# Patient Record
Sex: FEMALE | Race: WHITE | NOT HISPANIC OR LATINO
[De-identification: names, ages, dates, MRNs, and addresses within clinical notes are randomized per-mention and may not be internally consistent; named-entity substitution may affect disease eponyms.]

---

## 2018-09-27 PROBLEM — Z00.00 ENCOUNTER FOR PREVENTIVE HEALTH EXAMINATION: Status: ACTIVE | Noted: 2018-09-27

## 2018-10-15 ENCOUNTER — APPOINTMENT (OUTPATIENT)
Dept: HEART AND VASCULAR | Facility: CLINIC | Age: 68
End: 2018-10-15
Payer: MEDICARE

## 2018-10-15 VITALS
DIASTOLIC BLOOD PRESSURE: 70 MMHG | HEART RATE: 70 BPM | SYSTOLIC BLOOD PRESSURE: 121 MMHG | WEIGHT: 97 LBS | RESPIRATION RATE: 14 BRPM | TEMPERATURE: 97.7 F | OXYGEN SATURATION: 96 % | HEIGHT: 60 IN | BODY MASS INDEX: 19.04 KG/M2

## 2018-10-15 DIAGNOSIS — Z87.891 PERSONAL HISTORY OF NICOTINE DEPENDENCE: ICD-10-CM

## 2018-10-15 DIAGNOSIS — Z82.3 FAMILY HISTORY OF STROKE: ICD-10-CM

## 2018-10-15 PROCEDURE — 93880 EXTRACRANIAL BILAT STUDY: CPT

## 2018-10-15 PROCEDURE — 93306 TTE W/DOPPLER COMPLETE: CPT

## 2018-10-15 PROCEDURE — 99204 OFFICE O/P NEW MOD 45 MIN: CPT

## 2018-10-30 ENCOUNTER — APPOINTMENT (OUTPATIENT)
Dept: HEART AND VASCULAR | Facility: CLINIC | Age: 68
End: 2018-10-30
Payer: MEDICARE

## 2018-10-30 DIAGNOSIS — R94.31 ABNORMAL ELECTROCARDIOGRAM [ECG] [EKG]: ICD-10-CM

## 2018-10-30 DIAGNOSIS — R94.39 ABNORMAL RESULT OF OTHER CARDIOVASCULAR FUNCTION STUDY: ICD-10-CM

## 2018-10-30 DIAGNOSIS — I65.23 OCCLUSION AND STENOSIS OF BILATERAL CAROTID ARTERIES: ICD-10-CM

## 2018-10-30 PROCEDURE — A9500: CPT

## 2018-10-30 PROCEDURE — 78452 HT MUSCLE IMAGE SPECT MULT: CPT

## 2018-10-30 PROCEDURE — 93015 CV STRESS TEST SUPVJ I&R: CPT

## 2018-10-30 PROCEDURE — 99214 OFFICE O/P EST MOD 30 MIN: CPT | Mod: 25

## 2018-10-31 PROBLEM — R94.39 ABNORMAL STRESS ECG: Status: ACTIVE | Noted: 2018-10-31

## 2022-12-09 ENCOUNTER — APPOINTMENT (OUTPATIENT)
Dept: PULMONOLOGY | Facility: CLINIC | Age: 72
End: 2022-12-09

## 2022-12-09 VITALS
BODY MASS INDEX: 18.06 KG/M2 | HEIGHT: 60 IN | HEART RATE: 77 BPM | SYSTOLIC BLOOD PRESSURE: 120 MMHG | DIASTOLIC BLOOD PRESSURE: 74 MMHG | OXYGEN SATURATION: 96 % | WEIGHT: 92 LBS

## 2022-12-09 DIAGNOSIS — K58.9 IRRITABLE BOWEL SYNDROME W/OUT DIARRHEA: ICD-10-CM

## 2022-12-09 PROCEDURE — 99204 OFFICE O/P NEW MOD 45 MIN: CPT

## 2022-12-09 RX ORDER — TRIAMCINOLONE ACETONIDE 1 MG/G
0.1 CREAM TOPICAL
Qty: 30 | Refills: 0 | Status: ACTIVE | COMMUNITY
Start: 2022-11-29

## 2022-12-09 RX ORDER — TERIPARATIDE 250 UG/ML
600 INJECTION, SOLUTION SUBCUTANEOUS
Qty: 2 | Refills: 0 | Status: ACTIVE | COMMUNITY
Start: 2022-11-21

## 2022-12-09 RX ORDER — HYDROCORTISONE 25 MG/G
2.5 CREAM TOPICAL
Qty: 20 | Refills: 0 | Status: ACTIVE | COMMUNITY
Start: 2022-11-29

## 2022-12-09 RX ORDER — ELUXADOLINE 75 MG/1
75 TABLET, FILM COATED ORAL
Qty: 60 | Refills: 0 | Status: DISCONTINUED | COMMUNITY
Start: 2022-10-11 | End: 2022-12-09

## 2022-12-09 RX ORDER — NYSTATIN 100000 U/G
100000 OINTMENT TOPICAL
Qty: 30 | Refills: 0 | Status: ACTIVE | COMMUNITY
Start: 2022-11-29

## 2022-12-09 RX ORDER — ESCITALOPRAM OXALATE 10 MG/1
10 TABLET ORAL
Qty: 30 | Refills: 0 | Status: ACTIVE | COMMUNITY
Start: 2022-09-30

## 2022-12-09 RX ORDER — RIFAXIMIN 550 MG/1
550 TABLET ORAL
Qty: 42 | Refills: 0 | Status: DISCONTINUED | COMMUNITY
Start: 2022-08-17 | End: 2022-12-09

## 2022-12-09 RX ORDER — DIPHENOXYLATE HYDROCHLORIDE AND ATROPINE SULFATE 2.5; .025 MG/1; MG/1
2.5-0.025 TABLET ORAL
Qty: 120 | Refills: 0 | Status: ACTIVE | COMMUNITY
Start: 2022-11-18

## 2022-12-09 NOTE — HISTORY OF PRESENT ILLNESS
[TextBox_4] : 2022: Asked to evaluate patient by Dr Lincoln for lung nodules. Smoked about 2 ppd till 6-7 years ago. Has been told she has emphysema. Some dyspnea but not limited in her day to day activities; no history of respiratory exacerbations. No cough or sputum, no hemoptysis, no weight loss. No personal history of cancer. Her brother  in his 80s of lung cancer. 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 He  of Covid in 2020 along with her brother-in-law and sister-in-law. She was never symptomatic but had antibodies subsequently. She is fully vaccinated. [ESS] : 3

## 2022-12-09 NOTE — CONSULT LETTER
[Dear  ___] : Dear  [unfilled], [Courtesy Letter:] : I had the pleasure of seeing your patient, [unfilled], in my office today. [Please see my note below.] : Please see my note below. [Consult Closing:] : Thank you very much for allowing me to participate in the care of this patient.  If you have any questions, please do not hesitate to contact me. [Sincerely,] : Sincerely, [FreeTextEntry2] : Franklyn Lincoln MD\par [address], [address]\[place] [address]\par  [FreeTextEntry3] : Samia Zapien MD, FCCP\par

## 2022-12-09 NOTE — ASSESSMENT
[FreeTextEntry1] : Data reviewed:\par \par We reviewed her CTs together at [facility] from 2018, 2019, 2020, 2021 and 2022. These show severe emphysema with a dominant 5mm nodular opacity at the L apex which has not appreciably changed since 2018. There is some apical scarring.\par \par Impression:\par 5mm nodule\par Severe emphysema, asymptomatic\par \par Plan:\par Continue with annual LDCT.\par This particular nodule can be presumed benign given the long term stability.

## 2022-12-09 NOTE — PHYSICAL EXAM
[No Acute Distress] : no acute distress [Normal Rate/Rhythm] : normal rate/rhythm [Normal S1, S2] : normal s1, s2 [No Murmurs] : no murmurs [No Clubbing] : no clubbing [No Edema] : no edema

## 2023-01-06 ENCOUNTER — APPOINTMENT (OUTPATIENT)
Dept: PULMONOLOGY | Facility: CLINIC | Age: 73
End: 2023-01-06

## 2023-12-04 ENCOUNTER — INPATIENT (INPATIENT)
Facility: HOSPITAL | Age: 73
LOS: 5 days | Discharge: HOME HEALTH | DRG: 871 | End: 2023-12-10
Attending: INTERNAL MEDICINE | Admitting: INTERNAL MEDICINE
Payer: MEDICARE

## 2023-12-04 VITALS
DIASTOLIC BLOOD PRESSURE: 69 MMHG | HEIGHT: 60 IN | WEIGHT: 91.93 LBS | HEART RATE: 95 BPM | SYSTOLIC BLOOD PRESSURE: 100 MMHG | TEMPERATURE: 97 F | RESPIRATION RATE: 20 BRPM | OXYGEN SATURATION: 93 %

## 2023-12-04 DIAGNOSIS — J96.01 ACUTE RESPIRATORY FAILURE WITH HYPOXIA: ICD-10-CM

## 2023-12-04 DIAGNOSIS — Z29.9 ENCOUNTER FOR PROPHYLACTIC MEASURES, UNSPECIFIED: ICD-10-CM

## 2023-12-04 DIAGNOSIS — E78.5 HYPERLIPIDEMIA, UNSPECIFIED: ICD-10-CM

## 2023-12-04 DIAGNOSIS — J44.1 CHRONIC OBSTRUCTIVE PULMONARY DISEASE WITH (ACUTE) EXACERBATION: ICD-10-CM

## 2023-12-04 DIAGNOSIS — D49.0 NEOPLASM OF UNSPECIFIED BEHAVIOR OF DIGESTIVE SYSTEM: Chronic | ICD-10-CM

## 2023-12-04 DIAGNOSIS — B33.8 OTHER SPECIFIED VIRAL DISEASES: ICD-10-CM

## 2023-12-04 DIAGNOSIS — R05.9 COUGH, UNSPECIFIED: ICD-10-CM

## 2023-12-04 LAB
ALBUMIN SERPL ELPH-MCNC: 3.5 G/DL — SIGNIFICANT CHANGE UP (ref 3.3–5)
ALBUMIN SERPL ELPH-MCNC: 3.5 G/DL — SIGNIFICANT CHANGE UP (ref 3.3–5)
ALP SERPL-CCNC: 86 U/L — SIGNIFICANT CHANGE UP (ref 40–120)
ALP SERPL-CCNC: 86 U/L — SIGNIFICANT CHANGE UP (ref 40–120)
ALT FLD-CCNC: 25 U/L — SIGNIFICANT CHANGE UP (ref 10–45)
ALT FLD-CCNC: 25 U/L — SIGNIFICANT CHANGE UP (ref 10–45)
ANION GAP SERPL CALC-SCNC: 13 MMOL/L — SIGNIFICANT CHANGE UP (ref 5–17)
ANION GAP SERPL CALC-SCNC: 13 MMOL/L — SIGNIFICANT CHANGE UP (ref 5–17)
APPEARANCE UR: CLEAR — SIGNIFICANT CHANGE UP
APPEARANCE UR: CLEAR — SIGNIFICANT CHANGE UP
APTT BLD: 34.2 SEC — SIGNIFICANT CHANGE UP (ref 24.5–35.6)
APTT BLD: 34.2 SEC — SIGNIFICANT CHANGE UP (ref 24.5–35.6)
AST SERPL-CCNC: 44 U/L — HIGH (ref 10–40)
AST SERPL-CCNC: 44 U/L — HIGH (ref 10–40)
BACTERIA # UR AUTO: NEGATIVE /HPF — SIGNIFICANT CHANGE UP
BACTERIA # UR AUTO: NEGATIVE /HPF — SIGNIFICANT CHANGE UP
BASOPHILS # BLD AUTO: 0 K/UL — SIGNIFICANT CHANGE UP (ref 0–0.2)
BASOPHILS # BLD AUTO: 0 K/UL — SIGNIFICANT CHANGE UP (ref 0–0.2)
BASOPHILS NFR BLD AUTO: 0 % — SIGNIFICANT CHANGE UP (ref 0–2)
BASOPHILS NFR BLD AUTO: 0 % — SIGNIFICANT CHANGE UP (ref 0–2)
BILIRUB SERPL-MCNC: 0.4 MG/DL — SIGNIFICANT CHANGE UP (ref 0.2–1.2)
BILIRUB SERPL-MCNC: 0.4 MG/DL — SIGNIFICANT CHANGE UP (ref 0.2–1.2)
BILIRUB UR-MCNC: NEGATIVE — SIGNIFICANT CHANGE UP
BILIRUB UR-MCNC: NEGATIVE — SIGNIFICANT CHANGE UP
BUN SERPL-MCNC: 14 MG/DL — SIGNIFICANT CHANGE UP (ref 7–23)
BUN SERPL-MCNC: 14 MG/DL — SIGNIFICANT CHANGE UP (ref 7–23)
BURR CELLS BLD QL SMEAR: PRESENT — SIGNIFICANT CHANGE UP
BURR CELLS BLD QL SMEAR: PRESENT — SIGNIFICANT CHANGE UP
CALCIUM SERPL-MCNC: 9.6 MG/DL — SIGNIFICANT CHANGE UP (ref 8.4–10.5)
CALCIUM SERPL-MCNC: 9.6 MG/DL — SIGNIFICANT CHANGE UP (ref 8.4–10.5)
CAST: 19 /LPF — HIGH (ref 0–4)
CAST: 19 /LPF — HIGH (ref 0–4)
CHLORIDE SERPL-SCNC: 98 MMOL/L — SIGNIFICANT CHANGE UP (ref 96–108)
CHLORIDE SERPL-SCNC: 98 MMOL/L — SIGNIFICANT CHANGE UP (ref 96–108)
CO2 SERPL-SCNC: 25 MMOL/L — SIGNIFICANT CHANGE UP (ref 22–31)
CO2 SERPL-SCNC: 25 MMOL/L — SIGNIFICANT CHANGE UP (ref 22–31)
COLOR SPEC: YELLOW — SIGNIFICANT CHANGE UP
COLOR SPEC: YELLOW — SIGNIFICANT CHANGE UP
CREAT SERPL-MCNC: 0.79 MG/DL — SIGNIFICANT CHANGE UP (ref 0.5–1.3)
CREAT SERPL-MCNC: 0.79 MG/DL — SIGNIFICANT CHANGE UP (ref 0.5–1.3)
DIFF PNL FLD: ABNORMAL
DIFF PNL FLD: ABNORMAL
EGFR: 79 ML/MIN/1.73M2 — SIGNIFICANT CHANGE UP
EGFR: 79 ML/MIN/1.73M2 — SIGNIFICANT CHANGE UP
EOSINOPHIL # BLD AUTO: 0 K/UL — SIGNIFICANT CHANGE UP (ref 0–0.5)
EOSINOPHIL # BLD AUTO: 0 K/UL — SIGNIFICANT CHANGE UP (ref 0–0.5)
EOSINOPHIL NFR BLD AUTO: 0 % — SIGNIFICANT CHANGE UP (ref 0–6)
EOSINOPHIL NFR BLD AUTO: 0 % — SIGNIFICANT CHANGE UP (ref 0–6)
GLUCOSE SERPL-MCNC: 123 MG/DL — HIGH (ref 70–99)
GLUCOSE SERPL-MCNC: 123 MG/DL — HIGH (ref 70–99)
GLUCOSE UR QL: NEGATIVE MG/DL — SIGNIFICANT CHANGE UP
GLUCOSE UR QL: NEGATIVE MG/DL — SIGNIFICANT CHANGE UP
HCT VFR BLD CALC: 41.2 % — SIGNIFICANT CHANGE UP (ref 34.5–45)
HCT VFR BLD CALC: 41.2 % — SIGNIFICANT CHANGE UP (ref 34.5–45)
HGB BLD-MCNC: 13.5 G/DL — SIGNIFICANT CHANGE UP (ref 11.5–15.5)
HGB BLD-MCNC: 13.5 G/DL — SIGNIFICANT CHANGE UP (ref 11.5–15.5)
INR BLD: 1.14 — SIGNIFICANT CHANGE UP (ref 0.85–1.18)
INR BLD: 1.14 — SIGNIFICANT CHANGE UP (ref 0.85–1.18)
KETONES UR-MCNC: 15 MG/DL
KETONES UR-MCNC: 15 MG/DL
LACTATE SERPL-SCNC: 1 MMOL/L — SIGNIFICANT CHANGE UP (ref 0.5–2)
LACTATE SERPL-SCNC: 1 MMOL/L — SIGNIFICANT CHANGE UP (ref 0.5–2)
LEUKOCYTE ESTERASE UR-ACNC: ABNORMAL
LEUKOCYTE ESTERASE UR-ACNC: ABNORMAL
LYMPHOCYTES # BLD AUTO: 15.6 % — SIGNIFICANT CHANGE UP (ref 13–44)
LYMPHOCYTES # BLD AUTO: 15.6 % — SIGNIFICANT CHANGE UP (ref 13–44)
LYMPHOCYTES # BLD AUTO: 2.31 K/UL — SIGNIFICANT CHANGE UP (ref 1–3.3)
LYMPHOCYTES # BLD AUTO: 2.31 K/UL — SIGNIFICANT CHANGE UP (ref 1–3.3)
MANUAL SMEAR VERIFICATION: SIGNIFICANT CHANGE UP
MANUAL SMEAR VERIFICATION: SIGNIFICANT CHANGE UP
MCHC RBC-ENTMCNC: 28.7 PG — SIGNIFICANT CHANGE UP (ref 27–34)
MCHC RBC-ENTMCNC: 28.7 PG — SIGNIFICANT CHANGE UP (ref 27–34)
MCHC RBC-ENTMCNC: 32.8 GM/DL — SIGNIFICANT CHANGE UP (ref 32–36)
MCHC RBC-ENTMCNC: 32.8 GM/DL — SIGNIFICANT CHANGE UP (ref 32–36)
MCV RBC AUTO: 87.5 FL — SIGNIFICANT CHANGE UP (ref 80–100)
MCV RBC AUTO: 87.5 FL — SIGNIFICANT CHANGE UP (ref 80–100)
MONOCYTES # BLD AUTO: 1.67 K/UL — HIGH (ref 0–0.9)
MONOCYTES # BLD AUTO: 1.67 K/UL — HIGH (ref 0–0.9)
MONOCYTES NFR BLD AUTO: 11.3 % — SIGNIFICANT CHANGE UP (ref 2–14)
MONOCYTES NFR BLD AUTO: 11.3 % — SIGNIFICANT CHANGE UP (ref 2–14)
MRSA PCR RESULT.: NEGATIVE — SIGNIFICANT CHANGE UP
MRSA PCR RESULT.: NEGATIVE — SIGNIFICANT CHANGE UP
NEUTROPHILS # BLD AUTO: 10.68 K/UL — HIGH (ref 1.8–7.4)
NEUTROPHILS # BLD AUTO: 10.68 K/UL — HIGH (ref 1.8–7.4)
NEUTROPHILS NFR BLD AUTO: 69.6 % — SIGNIFICANT CHANGE UP (ref 43–77)
NEUTROPHILS NFR BLD AUTO: 69.6 % — SIGNIFICANT CHANGE UP (ref 43–77)
NEUTS BAND # BLD: 2.6 % — SIGNIFICANT CHANGE UP (ref 0–8)
NEUTS BAND # BLD: 2.6 % — SIGNIFICANT CHANGE UP (ref 0–8)
NITRITE UR-MCNC: NEGATIVE — SIGNIFICANT CHANGE UP
NITRITE UR-MCNC: NEGATIVE — SIGNIFICANT CHANGE UP
OVALOCYTES BLD QL SMEAR: SLIGHT — SIGNIFICANT CHANGE UP
OVALOCYTES BLD QL SMEAR: SLIGHT — SIGNIFICANT CHANGE UP
PH UR: 6 — SIGNIFICANT CHANGE UP (ref 5–8)
PH UR: 6 — SIGNIFICANT CHANGE UP (ref 5–8)
PLAT MORPH BLD: NORMAL — SIGNIFICANT CHANGE UP
PLAT MORPH BLD: NORMAL — SIGNIFICANT CHANGE UP
PLATELET # BLD AUTO: 291 K/UL — SIGNIFICANT CHANGE UP (ref 150–400)
PLATELET # BLD AUTO: 291 K/UL — SIGNIFICANT CHANGE UP (ref 150–400)
POIKILOCYTOSIS BLD QL AUTO: SLIGHT — SIGNIFICANT CHANGE UP
POIKILOCYTOSIS BLD QL AUTO: SLIGHT — SIGNIFICANT CHANGE UP
POLYCHROMASIA BLD QL SMEAR: SLIGHT — SIGNIFICANT CHANGE UP
POLYCHROMASIA BLD QL SMEAR: SLIGHT — SIGNIFICANT CHANGE UP
POTASSIUM SERPL-MCNC: 4 MMOL/L — SIGNIFICANT CHANGE UP (ref 3.5–5.3)
POTASSIUM SERPL-MCNC: 4 MMOL/L — SIGNIFICANT CHANGE UP (ref 3.5–5.3)
POTASSIUM SERPL-SCNC: 4 MMOL/L — SIGNIFICANT CHANGE UP (ref 3.5–5.3)
POTASSIUM SERPL-SCNC: 4 MMOL/L — SIGNIFICANT CHANGE UP (ref 3.5–5.3)
PROT SERPL-MCNC: 7.9 G/DL — SIGNIFICANT CHANGE UP (ref 6–8.3)
PROT SERPL-MCNC: 7.9 G/DL — SIGNIFICANT CHANGE UP (ref 6–8.3)
PROT UR-MCNC: 100 MG/DL
PROT UR-MCNC: 100 MG/DL
PROTHROM AB SERPL-ACNC: 13 SEC — SIGNIFICANT CHANGE UP (ref 9.5–13)
PROTHROM AB SERPL-ACNC: 13 SEC — SIGNIFICANT CHANGE UP (ref 9.5–13)
RAPID RVP RESULT: DETECTED
RAPID RVP RESULT: DETECTED
RBC # BLD: 4.71 M/UL — SIGNIFICANT CHANGE UP (ref 3.8–5.2)
RBC # BLD: 4.71 M/UL — SIGNIFICANT CHANGE UP (ref 3.8–5.2)
RBC # FLD: 13.3 % — SIGNIFICANT CHANGE UP (ref 10.3–14.5)
RBC # FLD: 13.3 % — SIGNIFICANT CHANGE UP (ref 10.3–14.5)
RBC BLD AUTO: ABNORMAL
RBC BLD AUTO: ABNORMAL
RBC CASTS # UR COMP ASSIST: 4 /HPF — SIGNIFICANT CHANGE UP (ref 0–4)
RBC CASTS # UR COMP ASSIST: 4 /HPF — SIGNIFICANT CHANGE UP (ref 0–4)
RSV RNA SPEC QL NAA+PROBE: DETECTED
RSV RNA SPEC QL NAA+PROBE: DETECTED
RVP NOTIFY: SIGNIFICANT CHANGE UP
RVP NOTIFY: SIGNIFICANT CHANGE UP
S AUREUS DNA NOSE QL NAA+PROBE: NEGATIVE — SIGNIFICANT CHANGE UP
S AUREUS DNA NOSE QL NAA+PROBE: NEGATIVE — SIGNIFICANT CHANGE UP
SARS-COV-2 RNA SPEC QL NAA+PROBE: SIGNIFICANT CHANGE UP
SARS-COV-2 RNA SPEC QL NAA+PROBE: SIGNIFICANT CHANGE UP
SODIUM SERPL-SCNC: 136 MMOL/L — SIGNIFICANT CHANGE UP (ref 135–145)
SODIUM SERPL-SCNC: 136 MMOL/L — SIGNIFICANT CHANGE UP (ref 135–145)
SP GR SPEC: 1.02 — SIGNIFICANT CHANGE UP (ref 1–1.03)
SP GR SPEC: 1.02 — SIGNIFICANT CHANGE UP (ref 1–1.03)
SPHEROCYTES BLD QL SMEAR: SLIGHT — SIGNIFICANT CHANGE UP
SPHEROCYTES BLD QL SMEAR: SLIGHT — SIGNIFICANT CHANGE UP
SQUAMOUS # UR AUTO: 15 /HPF — HIGH (ref 0–5)
SQUAMOUS # UR AUTO: 15 /HPF — HIGH (ref 0–5)
UROBILINOGEN FLD QL: 1 MG/DL — SIGNIFICANT CHANGE UP (ref 0.2–1)
UROBILINOGEN FLD QL: 1 MG/DL — SIGNIFICANT CHANGE UP (ref 0.2–1)
VARIANT LYMPHS # BLD: 0.9 % — SIGNIFICANT CHANGE UP (ref 0–6)
VARIANT LYMPHS # BLD: 0.9 % — SIGNIFICANT CHANGE UP (ref 0–6)
WBC # BLD: 14.79 K/UL — HIGH (ref 3.8–10.5)
WBC # BLD: 14.79 K/UL — HIGH (ref 3.8–10.5)
WBC # FLD AUTO: 14.79 K/UL — HIGH (ref 3.8–10.5)
WBC # FLD AUTO: 14.79 K/UL — HIGH (ref 3.8–10.5)
WBC UR QL: 9 /HPF — HIGH (ref 0–5)
WBC UR QL: 9 /HPF — HIGH (ref 0–5)

## 2023-12-04 PROCEDURE — 71045 X-RAY EXAM CHEST 1 VIEW: CPT | Mod: 26

## 2023-12-04 PROCEDURE — 99222 1ST HOSP IP/OBS MODERATE 55: CPT

## 2023-12-04 PROCEDURE — 93010 ELECTROCARDIOGRAM REPORT: CPT

## 2023-12-04 PROCEDURE — 99285 EMERGENCY DEPT VISIT HI MDM: CPT | Mod: FS

## 2023-12-04 RX ORDER — AZITHROMYCIN 500 MG/1
TABLET, FILM COATED ORAL
Refills: 0 | Status: COMPLETED | OUTPATIENT
Start: 2023-12-04 | End: 2023-12-08

## 2023-12-04 RX ORDER — ACETAMINOPHEN 500 MG
975 TABLET ORAL EVERY 6 HOURS
Refills: 0 | Status: DISCONTINUED | OUTPATIENT
Start: 2023-12-04 | End: 2023-12-10

## 2023-12-04 RX ORDER — ACETAMINOPHEN 500 MG
975 TABLET ORAL ONCE
Refills: 0 | Status: COMPLETED | OUTPATIENT
Start: 2023-12-04 | End: 2023-12-04

## 2023-12-04 RX ORDER — ENOXAPARIN SODIUM 100 MG/ML
40 INJECTION SUBCUTANEOUS EVERY 24 HOURS
Refills: 0 | Status: DISCONTINUED | OUTPATIENT
Start: 2023-12-04 | End: 2023-12-10

## 2023-12-04 RX ORDER — SODIUM CHLORIDE 9 MG/ML
1000 INJECTION INTRAMUSCULAR; INTRAVENOUS; SUBCUTANEOUS ONCE
Refills: 0 | Status: COMPLETED | OUTPATIENT
Start: 2023-12-04 | End: 2023-12-04

## 2023-12-04 RX ORDER — CEFTRIAXONE 500 MG/1
1000 INJECTION, POWDER, FOR SOLUTION INTRAMUSCULAR; INTRAVENOUS ONCE
Refills: 0 | Status: COMPLETED | OUTPATIENT
Start: 2023-12-04 | End: 2023-12-04

## 2023-12-04 RX ORDER — BUDESONIDE AND FORMOTEROL FUMARATE DIHYDRATE 160; 4.5 UG/1; UG/1
2 AEROSOL RESPIRATORY (INHALATION)
Refills: 0 | Status: DISCONTINUED | OUTPATIENT
Start: 2023-12-04 | End: 2023-12-10

## 2023-12-04 RX ORDER — ATORVASTATIN CALCIUM 80 MG/1
1 TABLET, FILM COATED ORAL
Refills: 0 | DISCHARGE

## 2023-12-04 RX ORDER — SODIUM CHLORIDE 9 MG/ML
1000 INJECTION INTRAMUSCULAR; INTRAVENOUS; SUBCUTANEOUS ONCE
Refills: 0 | Status: DISCONTINUED | OUTPATIENT
Start: 2023-12-04 | End: 2023-12-04

## 2023-12-04 RX ORDER — IPRATROPIUM/ALBUTEROL SULFATE 18-103MCG
3 AEROSOL WITH ADAPTER (GRAM) INHALATION
Refills: 0 | Status: COMPLETED | OUTPATIENT
Start: 2023-12-04 | End: 2023-12-04

## 2023-12-04 RX ORDER — DIPHENOXYLATE HCL/ATROPINE 2.5-.025MG
2 TABLET ORAL
Refills: 0 | DISCHARGE

## 2023-12-04 RX ORDER — SODIUM CHLORIDE 9 MG/ML
500 INJECTION, SOLUTION INTRAVENOUS ONCE
Refills: 0 | Status: COMPLETED | OUTPATIENT
Start: 2023-12-04 | End: 2023-12-04

## 2023-12-04 RX ORDER — IPRATROPIUM/ALBUTEROL SULFATE 18-103MCG
3 AEROSOL WITH ADAPTER (GRAM) INHALATION EVERY 4 HOURS
Refills: 0 | Status: DISCONTINUED | OUTPATIENT
Start: 2023-12-04 | End: 2023-12-07

## 2023-12-04 RX ORDER — AZITHROMYCIN 500 MG/1
500 TABLET, FILM COATED ORAL EVERY 24 HOURS
Refills: 0 | Status: COMPLETED | OUTPATIENT
Start: 2023-12-05 | End: 2023-12-07

## 2023-12-04 RX ORDER — AZITHROMYCIN 500 MG/1
500 TABLET, FILM COATED ORAL ONCE
Refills: 0 | Status: COMPLETED | OUTPATIENT
Start: 2023-12-04 | End: 2023-12-04

## 2023-12-04 RX ORDER — CEFTRIAXONE 500 MG/1
1000 INJECTION, POWDER, FOR SOLUTION INTRAMUSCULAR; INTRAVENOUS EVERY 24 HOURS
Refills: 0 | Status: DISCONTINUED | OUTPATIENT
Start: 2023-12-05 | End: 2023-12-06

## 2023-12-04 RX ADMIN — Medication 3 MILLILITER(S): at 16:14

## 2023-12-04 RX ADMIN — SODIUM CHLORIDE 1000 MILLILITER(S): 9 INJECTION INTRAMUSCULAR; INTRAVENOUS; SUBCUTANEOUS at 15:25

## 2023-12-04 RX ADMIN — Medication 125 MILLIGRAM(S): at 16:02

## 2023-12-04 RX ADMIN — Medication 975 MILLIGRAM(S): at 20:34

## 2023-12-04 RX ADMIN — ENOXAPARIN SODIUM 40 MILLIGRAM(S): 100 INJECTION SUBCUTANEOUS at 20:31

## 2023-12-04 RX ADMIN — AZITHROMYCIN 255 MILLIGRAM(S): 500 TABLET, FILM COATED ORAL at 21:04

## 2023-12-04 RX ADMIN — Medication 3 MILLILITER(S): at 22:00

## 2023-12-04 RX ADMIN — SODIUM CHLORIDE 500 MILLILITER(S): 9 INJECTION, SOLUTION INTRAVENOUS at 21:05

## 2023-12-04 RX ADMIN — Medication 975 MILLIGRAM(S): at 17:56

## 2023-12-04 RX ADMIN — CEFTRIAXONE 100 MILLIGRAM(S): 500 INJECTION, POWDER, FOR SOLUTION INTRAMUSCULAR; INTRAVENOUS at 20:31

## 2023-12-04 RX ADMIN — Medication 3 MILLILITER(S): at 16:35

## 2023-12-04 RX ADMIN — Medication 3 MILLILITER(S): at 16:03

## 2023-12-04 NOTE — CONSULT NOTE ADULT - ASSESSMENT
NEURO:      CARDIOVASCULAR:      PULMONARY:      GI:      /Renal:      HEME:      ENDO:      ID:      OTHER: 74 y/o f hx emphysema, HLD, osteoporosis presents c/o cough, congestion, sore throat x 1 week and SOB for 2 days, found to be in COPD exacerbation 2/2 RSV infection. ICU consulted for respiratory distress.    #COPD exacerbation  #RSV infection  #Acute hypoxic respiratory failure   Pt with known severe emphysema, asymptomatic at baseline not on maintenance inhalers. Follows with Dr. Zapien. Baseline O2 >95%  RSV positive in ED, likely trigger for COPD exacerbation  - C/w prednisone 40mg daily  - C/w duonebs standing q6hrs  - Start ceftriaxone 1g q24hrs, azithromycin 500mg q24hrs  - F/u urine legionella, urine strep  - F/u MRSA swab  - C/w NC for supplemental O2, goal SpO2 88-92%. If patient becomes more hypoxic/increased work of breathing, can start HFNC at 30/40.     #Cough  2/2 RSV infection  - Ok to monitor, can give cough suppressants if cough is bothersome    F: dry on exam, give additional 500cc LR  E: replete as needed  N: regular diet  Dvt ppx: lovenox  Code status: FULL code  Dispo: [facility]

## 2023-12-04 NOTE — H&P ADULT - PROBLEM SELECTOR PLAN 5
Fluids: Dry on exam, give additional 500cc LR  Electrolytes: Replete as needed  Nutrition: regular diet  Dvt ppx: Lovenox 40mg q24  Code status: FULL code    Dispo: [facility] Patient with a known hx of HLD.  - c/w home med atorvastatin 20mg PO qd

## 2023-12-04 NOTE — H&P ADULT - NSHPLABSRESULTS_GEN_ALL_CORE
.  LABS:                         13.5   14.79 )-----------( 291      ( 04 Dec 2023 13:29 )             41.2     12    136  |  98  |  14  ----------------------------<  123<H>  4.0   |  25  |  0.79    Ca    9.6      04 Dec 2023 13:29    TPro  7.9  /  Alb  3.5  /  TBili  0.4  /  DBili  x   /  AST  44<H>  /  ALT  25  /  AlkPhos  86  12-04    PT/INR - ( 04 Dec 2023 13:29 )   PT: 13.0 sec;   INR: 1.14          PTT - ( 04 Dec 2023 13:29 )  PTT:34.2 sec  Urinalysis Basic - ( 04 Dec 2023 15:33 )    Color: Yellow / Appearance: Clear / S.023 / pH: x  Gluc: x / Ketone: 15 mg/dL  / Bili: Negative / Urobili: 1.0 mg/dL   Blood: x / Protein: 100 mg/dL / Nitrite: Negative   Leuk Esterase: Small / RBC: 4 /HPF / WBC 9 /HPF   Sq Epi: x / Non Sq Epi: 15 /HPF / Bacteria: Negative /HPF            Lactate, Blood: 1.0 mmol/L ( @ 15:01)      RADIOLOGY, EKG & ADDITIONAL TESTS: Reviewed.

## 2023-12-04 NOTE — ED ADULT TRIAGE NOTE - CHIEF COMPLAINT QUOTE
pt c/o generalized weakness,  dizziness, cough, sob x 1 week. stated had a negative covid test on Friday.

## 2023-12-04 NOTE — H&P ADULT - PROBLEM SELECTOR PLAN 1
Pt with known severe emphysema, asymptomatic at baseline not on maintenance inhalers. Follows with Dr. Zapien. Baseline O2 >95%. RSV positive in ED, likely trigger for COPD exacerbation  - C/w prednisone 40mg daily  - C/w duonebs standing q6hrs  - Start ceftriaxone 1g q24hrs, azithromycin 500mg q24hrs  - F/u urine legionella, urine strep  - F/u MRSA swab  - C/w NC for supplemental O2, goal SpO2 88-92%. If patient becomes more hypoxic/increased work of breathing, can start HFNC at 30/40.

## 2023-12-04 NOTE — H&P ADULT - NSHPREVIEWOFSYSTEMS_GEN_ALL_CORE
REVIEW OF SYSTEMS:  CONSTITUTIONAL: No weakness, +fevers, -chills  EYES/ENT: No visual changes;  No vertigo or throat pain   NECK: No pain or stiffness  RESPIRATORY: No cough, wheezing, hemoptysis; +shortness of breath  CARDIOVASCULAR: No chest pain or palpitations  GASTROINTESTINAL: No abdominal or epigastric pain. No nausea, vomiting, or hematemesis; No diarrhea or constipation. No melena or hematochezia.  GENITOURINARY: No dysuria, frequency or hematuria  NEUROLOGICAL: No numbness or weakness  SKIN: No itching, rashes REVIEW OF SYSTEMS:  CONSTITUTIONAL: No weakness, +fevers, -chills  EYES/ENT: No visual changes;  No vertigo or throat pain   NECK: No pain or stiffness  RESPIRATORY: +cough, +shortness of breath; no wheezing, hemoptysis  CARDIOVASCULAR: No chest pain or palpitations  GASTROINTESTINAL: No abdominal or epigastric pain. No nausea, vomiting, or hematemesis; No diarrhea or constipation. No melena or hematochezia.  GENITOURINARY: No dysuria, frequency or hematuria  NEUROLOGICAL: No numbness; + chronic left-sided facial weakness following parotid gland surgery  SKIN: No itching, rashes REVIEW OF SYSTEMS:  CONSTITUTIONAL: No weakness, +fevers, -chills  EYES/ENT: No visual changes;  No vertigo or throat pain   NECK: No pain or stiffness  RESPIRATORY: +cough, +shortness of breath; no wheezing, hemoptysis  CARDIOVASCULAR: No chest pain; +rapid HR  GASTROINTESTINAL: No abdominal or epigastric pain. No nausea, vomiting, or hematemesis; No diarrhea or constipation. No melena or hematochezia.  GENITOURINARY: No dysuria, frequency or hematuria  NEUROLOGICAL: No numbness; + chronic left-sided facial weakness following parotid gland surgery  SKIN: No itching, rashes REVIEW OF SYSTEMS:  CONSTITUTIONAL: No weakness, +fevers at home, -chills  EYES/ENT: No visual changes;  No vertigo or throat pain   NECK: No pain or stiffness  RESPIRATORY: +cough, +shortness of breath; no wheezing, hemoptysis  CARDIOVASCULAR: No chest pain; +rapid HR  GASTROINTESTINAL: No abdominal or epigastric pain. No nausea, vomiting, or hematemesis; No diarrhea or constipation. No melena or hematochezia.  GENITOURINARY: No dysuria, frequency or hematuria  NEUROLOGICAL: No numbness; + chronic left-sided facial weakness following parotid gland surgery  SKIN: No itching, rashes

## 2023-12-04 NOTE — ED PROVIDER NOTE - NS ED ATTENDING STATEMENT MOD
Attending Only This was a shared visit with the AMAIRANI. I reviewed and verified the documentation and independently performed the documented:

## 2023-12-04 NOTE — H&P ADULT - PROBLEM SELECTOR PLAN 3
Pt with known severe emphysema, asymptomatic at baseline not on maintenance inhalers. Follows with Dr. Zapien. Baseline O2 >95%. RSV positive in ED, likely trigger for COPD exacerbation. Patient desaturated to SpO2 88%in the ED.  - See COPD management above.

## 2023-12-04 NOTE — ED ADULT NURSE NOTE - OBJECTIVE STATEMENT
Patient presents to the ED complaining of cough, chills, shortness of breath for the past 3 days. Patient reports history of emphysema. Denies any chest pain., Patient noted to be tachypneic. Placed on 2L nc.

## 2023-12-04 NOTE — H&P ADULT - PROBLEM SELECTOR PLAN 6
Fluids: Dry on exam, give additional 500cc LR  Electrolytes: Replete as needed  Nutrition: regular diet  Dvt ppx: Lovenox 40mg q24  Code status: FULL code    Dispo: [facility]

## 2023-12-04 NOTE — ED PROVIDER NOTE - ATTENDING APP SHARED VISIT CONTRIBUTION OF CARE
72 y/o f hx emphysema, HLD presents c/o cough x 1 week with sob.  Pt stating cough has been productive of whitish sputum, now having some nasal congestion today as well.  Pt has felt feverish, hasn't been taking her temp.  Denies CP, n/v/d, all other ROS negative.    Pt afebrile in ED, dyspneic on exam with coarse breath sounds, no wheezing.  O2 sat 90% on RA, improved with nasal cannula.  Pt given nebs and steroids, still sob, rsv +.  Will admit.    Agree with above note as documented by PA.  I was available as the supervising attending during patient's ER evaluation.    Pt with known copd with O2 sat 92% at baseline presenting with URI symptoms and shortness of breath.  O2 sat 88% on RA in ED - xray with b.l infiltrates c/w viral PNA.  Treated with albuterol and HR increased to 120-130s sinus tach.  Will obtain [facility] consult and signed out to Dr. Hansen pending dispo.

## 2023-12-04 NOTE — H&P ADULT - ATTENDING COMMENTS
73 F severe COPD (not on medication or oxygen), HLD, and osteoporosis presents c/o cough, sputum, sore throat, and dyspnea for 5 days. The sputum has been yellow green. She denies previous hospital admissions for exacerbation or being on oxygen in the past. At baseline, patient states that she is able to walk without stopping to catch her breath, and is able to climb a flight of stairs without difficulty. She has 30+ pack year smoking history, quit in 2014. She is positive for RSV. She has history of lung nodules; the CXR now showed LLL opacities. Physical exam as above.   1. COPD exacerbation  2. Acute respiratory failure with hypoxemia  3. RSV LRTI  - prednisone for 5 days  - DuoNeb q4-6h  - titrate oxygen for sat of 92%  - start ceftriaxone/azthromycin  - iv fluids

## 2023-12-04 NOTE — CONSULT NOTE ADULT - ATTENDING COMMENTS
73 F severe COPD (not on medication or oxygen), HLD, and osteoporosis presents c/o cough, sputum, sore throat, and dyspnea for 5 days. The sputum has been yellow green. She denies previous hospital admissions for exacerbation or being on oxygen in the past. At baseline, patient states that she is able to walk without stopping to catch her breath, and is able to climb a flight of stairs without difficulty. She has 30+ pack year smoking history, quit in 2014. She is positive for RSV. She has history of lung nodules; the CXR now showed LLL opacities. Physical exam as above.   1. COPD exacerbation  2. Acute respiratory failure with hypoxemia  3. RSV LRTI  - prednisone for 5 days  - DuoNeb q4-6h  - titrate oxygen for sat of 92%  - start ceftriaxone/azthromycin

## 2023-12-04 NOTE — PATIENT PROFILE ADULT - FALL HARM RISK - HARM RISK INTERVENTIONS
Assistance with ambulation/Communicate Risk of Fall with Harm to all staff/Reinforce activity limits and safety measures with patient and family/Tailored Fall Risk Interventions/Visual Cue: Yellow wristband and red socks/Bed in lowest position, wheels locked, appropriate side rails in place/Call bell, personal items and telephone in reach/Instruct patient to call for assistance before getting out of bed or chair/Non-slip footwear when patient is out of bed/[place] to call system/Physically safe environment - no spills, clutter or unnecessary equipment/Purposeful Proactive Rounding/Room/bathroom lighting operational, light cord in reach Assistance with ambulation/Communicate Risk of Fall with Harm to all staff/Reinforce activity limits and safety measures with patient and family/Tailored Fall Risk Interventions/Visual Cue: Yellow wristband and red socks/Bed in lowest position, wheels locked, appropriate side rails in place/Call bell, personal items and telephone in reach/Instruct patient to call for assistance before getting out of bed or chair/Non-slip footwear when patient is out of bed/[place] to call system/Physically safe environment - no spills, clutter or unnecessary equipment/Purposeful Proactive Rounding/Room/bathroom lighting operational, light cord in reach None available

## 2023-12-04 NOTE — ED ADULT NURSE NOTE - NSFALLUNIVINTERV_ED_ALL_ED
Bed/Stretcher in lowest position, wheels locked, appropriate side rails in place/Call bell, personal items and telephone in reach/Instruct patient to call for assistance before getting out of bed/chair/stretcher/Non-slip footwear applied when patient is off stretcher/[place] to call system/Physically safe environment - no spills, clutter or unnecessary equipment/Purposeful proactive rounding/Room/bathroom lighting operational, light cord in reach Bed/Stretcher in lowest position, wheels locked, appropriate side rails in place/Call bell, personal items and telephone in reach/Instruct patient to call for assistance before getting out of bed/chair/stretcher/Non-slip footwear applied when patient is off stretcher/[place] to call system/Physically safe environment - no spills, clutter or unnecessary equipment/Purposeful proactive rounding/Room/bathroom lighting operational, light cord in reach

## 2023-12-04 NOTE — CONSULT NOTE ADULT - SUBJECTIVE AND OBJECTIVE BOX
Patient is a 73y old  Female who presents with a chief complaint of shortness of breath, cough    Consult reason: Respiratory distress    Pt is a 72 y/o f hx emphysema, HLD, osteoporosis presents c/o cough, congestion, sore throat x 1 week with sob for the past 2 days. Also feeling feverish at home but has not taken her temp at home. At baseline, patient states that she is able to walk without stopping to catch her breath, and is able to climb a flight of stairs without difficulty. She does not take any inhalers for her emphysema. Follows with Dr. Zapien in pulm. Has 30+ pack year smoking history, quit in . Pt denies chest pain, nausea, vomiting, diarrhea, abdominal pain, extremity swelling.    ED vitals: T 97.7, HR 95 -> 125, /63, RR 22, SpO2 88% on RA -> 95% on 4L NC  Labs significant: WBC 14.79, Hgb 13.5, plts 291. Coags unremarkable. CMP with glucose 123, AST 44, otherwise unremarkable. lactate 1.0. UA with protein and ketones. RSV + on RVP.   Imaging/EKG:   CXR: hyperinflated lungs with bilateral hazy opacities  EKG: sinus tachycardia  Interventions: 2L NS, tylenol 975mg x1, solumedrol 125mg x1, duonebs x3    Allergies    No Known Allergies    Intolerances      Home Medications:  Atorvastatin 20mg daily  Forteo injection daily  Lomotil for diarrhea    SOCIAL HX:     Smoking          ETOH/Illicit drugs          Occupation    PAST MEDICAL & SURGICAL HISTORY:  No pertinent past medical history      Parotid tumor  resection      FAMILY HISTORY:  :    No known cardiovascular or pulmonary family history     ROS:  See HPI     PHYSICAL EXAM    ICU Vital Signs Last 24 Hrs  T(C): 36.7 (04 Dec 2023 19:10), Max: 37.2 (04 Dec 2023 17:48)  T(F): 98.1 (04 Dec 2023 19:10), Max: 99 (04 Dec 2023 17:48)  HR: 124 (04 Dec 2023 19:10) (95 - 133)  BP: 100/63 (04 Dec 2023 19:10) (100/63 - 109/72)  BP(mean): --  ABP: --  ABP(mean): --  RR: 20 (04 Dec 2023 19:10) (18 - 22)  SpO2: 94% (04 Dec 2023 19:10) (88% - 95%)    O2 Parameters below as of 04 Dec 2023 19:10  Patient On (Oxygen Delivery Method): room air, electronic    General: Not in distress, some conversational dyspnea  HEENT: asymmetric movement of face 2/2 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  Moves all extremities.    Skin: Warm.  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), no other motor or sensory deficits        LABS:                          13.5   14.79 )-----------( 291      ( 04 Dec 2023 13:29 )             41.2                                               12-04    136  |  98  |  14  ----------------------------<  123<H>  4.0   |  25  |  0.79    Ca    9.6      04 Dec 2023 13:29    TPro  7.9  /  Alb  3.5  /  TBili  0.4  /  DBili  x   /  AST  44<H>  /  ALT  25  /  AlkPhos  86  12-04      PT/INR - ( 04 Dec 2023 13:29 )   PT: 13.0 sec;   INR: 1.14          PTT - ( 04 Dec 2023 13:29 )  PTT:34.2 sec                                       Urinalysis Basic - ( 04 Dec 018937:33 )    Color: Yellow / Appearance: Clear / S.023 / pH: x  Gluc: x / Ketone: 15 mg/dL  / Bili: Negative / Urobili: 1.0 mg/dL   Blood: x / Protein: 100 mg/dL / Nitrite: Negative   Leuk Esterase: Small / RBC: 4 /HPF / WBC 9 /HPF   Sq Epi: x / Non Sq Epi: 15 /HPF / Bacteria: Negative /HPF                                                  LIVER FUNCTIONS - ( 04 Dec 2023 13:29 )  Alb: 3.5 g/dL / Pro: 7.9 g/dL / ALK PHOS: 86 U/L / ALT: 25 U/L / AST: 44 U/L / GGT: x                                                  Urinalysis with Rflx Culture (collected 04 Dec 2023 15:33)           Patient is a 73y old  Female who presents with a chief complaint of shortness of breath, cough    Consult reason: Respiratory distress    Pt is a 72 y/o f hx emphysema, HLD, osteoporosis presents c/o cough, congestion, sore throat x 1 week with sob for the past 2 days. Also feeling feverish at home but has not taken her temp at home. At baseline, patient states that she is able to walk without stopping to catch her breath, and is able to climb a flight of stairs without difficulty. She does not take any inhalers for her emphysema. Follows with Dr. Zapien in pulm. Has 30+ pack year smoking history, quit in . Pt denies chest pain, nausea, vomiting, diarrhea, abdominal pain, extremity swelling.    ED vitals: T 97.7, HR 95 -> 125, /63, RR 22, SpO2 88% on RA -> 95% on 4L NC  Labs significant: WBC 14.79, Hgb 13.5, plts 291. Coags unremarkable. CMP with glucose 123, AST 44, otherwise unremarkable. lactate 1.0. UA with protein and ketones. RSV + on RVP.   Imaging/EKG:   CXR: hyperinflated lungs with bilateral hazy opacities  EKG: sinus tachycardia  Interventions: 2L NS, tylenol 975mg x1, solumedrol 125mg x1, duonebs x3    Allergies    No Known Allergies    Intolerances      Home Medications:  Atorvastatin 20mg daily  Forteo injection daily  Lomotil for diarrhea    SOCIAL HX:     Smoking          ETOH/Illicit drugs          Occupation    PAST MEDICAL & SURGICAL HISTORY:  No pertinent past medical history      Parotid tumor  resection      FAMILY HISTORY:  :    No known cardiovascular or pulmonary family history     ROS:  See HPI     PHYSICAL EXAM    ICU Vital Signs Last 24 Hrs  T(C): 36.7 (04 Dec 2023 19:10), Max: 37.2 (04 Dec 2023 17:48)  T(F): 98.1 (04 Dec 2023 19:10), Max: 99 (04 Dec 2023 17:48)  HR: 124 (04 Dec 2023 19:10) (95 - 133)  BP: 100/63 (04 Dec 2023 19:10) (100/63 - 109/72)  BP(mean): --  ABP: --  ABP(mean): --  RR: 20 (04 Dec 2023 19:10) (18 - 22)  SpO2: 94% (04 Dec 2023 19:10) (88% - 95%)    O2 Parameters below as of 04 Dec 2023 19:10  Patient On (Oxygen Delivery Method): room air, electronic    General: Not in distress, some conversational dyspnea  HEENT: asymmetric movement of face 2/2 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  Moves all extremities.    Skin: Warm.  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), no other motor or sensory deficits        LABS:                          13.5   14.79 )-----------( 291      ( 04 Dec 2023 13:29 )             41.2                                               12-04    136  |  98  |  14  ----------------------------<  123<H>  4.0   |  25  |  0.79    Ca    9.6      04 Dec 2023 13:29    TPro  7.9  /  Alb  3.5  /  TBili  0.4  /  DBili  x   /  AST  44<H>  /  ALT  25  /  AlkPhos  86  12-04      PT/INR - ( 04 Dec 2023 13:29 )   PT: 13.0 sec;   INR: 1.14          PTT - ( 04 Dec 2023 13:29 )  PTT:34.2 sec                                       Urinalysis Basic - ( 04 Dec 287302:33 )    Color: Yellow / Appearance: Clear / S.023 / pH: x  Gluc: x / Ketone: 15 mg/dL  / Bili: Negative / Urobili: 1.0 mg/dL   Blood: x / Protein: 100 mg/dL / Nitrite: Negative   Leuk Esterase: Small / RBC: 4 /HPF / WBC 9 /HPF   Sq Epi: x / Non Sq Epi: 15 /HPF / Bacteria: Negative /HPF                                                  LIVER FUNCTIONS - ( 04 Dec 2023 13:29 )  Alb: 3.5 g/dL / Pro: 7.9 g/dL / ALK PHOS: 86 U/L / ALT: 25 U/L / AST: 44 U/L / GGT: x                                                  Urinalysis with Rflx Culture (collected 04 Dec 2023 15:33)

## 2023-12-04 NOTE — H&P ADULT - NSHPPHYSICALEXAM_GEN_ALL_CORE
.  VITAL SIGNS:  T(C): 36.7 (12-04-23 @ 19:10), Max: 37.2 (12-04-23 @ 17:48)  T(F): 98.1 (12-04-23 @ 19:10), Max: 99 (12-04-23 @ 17:48)  HR: 124 (12-04-23 @ 19:10) (95 - 133)  BP: 100/63 (12-04-23 @ 19:10) (100/63 - 109/72)  BP(mean): --  RR: 20 (12-04-23 @ 19:10) (18 - 22)  SpO2: 94% (12-04-23 @ 19:10) (88% - 95%)  Wt(kg): --    PHYSICAL EXAM:    Constitutional: resting comfortably in bed; NAD  Head: NC/AT  Eyes: PERRL, EOMI, anicteric sclera  ENT: no nasal discharge; uvula midline, no oropharyngeal erythema or exudates; MMM  Neck: supple; no JVD or thyromegaly  Respiratory: CTA B/L; no W/R/R, no retractions  Cardiac: +S1/S2; RRR; no M/R/G;   Gastrointestinal: abdomen soft, NT/ND; no rebound or guarding; +BSx4  Back: spine midline, no bony tenderness or step-offs; no CVAT B/L  Extremities: WWP, no clubbing or cyanosis; no peripheral edema  Musculoskeletal: NROM x4; no joint swelling, tenderness or erythema  Vascular: 2+ radial, DP pulses B/L  Dermatologic: skin warm, dry and intact; no rashes, wounds, or scars  Lymphatic: no submandibular or cervical LAD  Neurologic: AAOx3; CNII-XII grossly intact; no focal deficits  Psychiatric: affect and characteristics of appearance, verbalizations, behaviors are appropriate .  VITAL SIGNS:  T(C): 36.7 (12-04-23 @ 19:10), Max: 37.2 (12-04-23 @ 17:48)  T(F): 98.1 (12-04-23 @ 19:10), Max: 99 (12-04-23 @ 17:48)  HR: 124 (12-04-23 @ 19:10) (95 - 133)  BP: 100/63 (12-04-23 @ 19:10) (100/63 - 109/72)  BP(mean): --  RR: 20 (12-04-23 @ 19:10) (18 - 22)  SpO2: 94% (12-04-23 @ 19:10) (88% - 95%)  Wt(kg): --    PHYSICAL EXAM:    Constitutional: resting comfortably in bed; NAD  Head: NC/AT  Eyes: PERRL, EOMI, anicteric sclera  ENT: no nasal discharge; uvula midline, no oropharyngeal erythema or exudates; MMM  Neck: supple; no JVD or thyromegaly  Respiratory: decreased breath sounds b/l  Cardiac: +S1/S2; tachycardic; regular rhythm; no M/R/G;   Gastrointestinal: abdomen soft, NT/ND; no rebound or guarding; +BSx4  Back: spine midline, no bony tenderness or step-offs; no CVAT B/L  Extremities: WWP, no clubbing or cyanosis; no peripheral edema  Musculoskeletal: NROM x4; no joint swelling, tenderness or erythema  Vascular: 2+ radial, DP pulses B/L  Dermatologic: skin warm, dry and intact; no rashes, wounds, or scars  Lymphatic: no submandibular or cervical LAD  Neurologic: AAOx3; CNII-XII grossly intact; no focal deficits. 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  VITAL SIGNS:  T(C): 36.7 (12-04-23 @ 19:10), Max: 37.2 (12-04-23 @ 17:48)  T(F): 98.1 (12-04-23 @ 19:10), Max: 99 (12-04-23 @ 17:48)  HR: 124 (12-04-23 @ 19:10) (95 - 133)  BP: 100/63 (12-04-23 @ 19:10) (100/63 - 109/72)  BP(mean): --  RR: 20 (12-04-23 @ 19:10) (18 - 22)  SpO2: 94% (12-04-23 @ 19:10) (88% - 95%)  Wt(kg): --    PHYSICAL EXAM:    Constitutional: resting comfortably in bed; NAD  Head: NC/AT  Eyes: PERRL, EOMI, anicteric sclera  ENT: no nasal discharge; uvula midline, no oropharyngeal erythema or exudates; MMM  Neck: supple; no JVD or thyromegaly  Respiratory: decreased breath sounds b/l; expiratory wheezing L>R; barrel chest   Cardiac: +S1/S2; tachycardic; regular rhythm; no M/R/G;   Gastrointestinal: abdomen soft, NT/ND; no rebound or guarding; +BSx4  Back: spine midline, no bony tenderness or step-offs; no CVAT B/L  Extremities: WWP, no clubbing or cyanosis; mild b/l, non-pitting peripheral edema LE  Musculoskeletal: NROM x4; no joint swelling, tenderness or erythema  Vascular: 2+ radial, DP pulses B/L  Dermatologic: skin warm, dry and intact; no rashes, wounds, or scars  Lymphatic: no submandibular or cervical LAD  Neurologic: AAOx3; 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  Psychiatric: affect and characteristics of appearance, verbalizations, behaviors are appropriate

## 2023-12-04 NOTE — CONSULT NOTE ADULT - SUBJECTIVE AND OBJECTIVE BOX
Patient is a 73y old  Female who presents with a chief complaint of     Consult reason: Respiratory failure require HFNC    HPI:  74 y/o f hx emphysema, HLD, osteoporosis presents c/o cough, congestion, sore throat x 1 week with sob for the past 2 days. Also feeling feverish at home but has not taken her temp at home. At baseline, patient states that she is able to walk without stopping to catch her breath, and is able to climb a flight of stairs without difficulty. She does not take any inhalers for her emphysema. Follows with Dr. Zapien in pulm. Has 30+ pack year smoking history, quit in . Pt denies chest pain, nausea, vomiting, diarrhea, abdominal pain, extremity swelling.    ED vitals: T 97.7F  HR 95 RR 20-22  /63 SpO2 88% on RA  Labs significant: WBC 14.79, AST 44. Lactate 1.0. RVP negative for RSV.   Imaging/EKG: CXR: hyperinflated lungs consistent with emphysema.  EKG: sinus tachycardia  Interventions: solumedrol 125mg x1, tylenol 975mg x1, duonebs x3, 1L NS      Allergies    No Known Allergies    Intolerances      Home Medications:      SOCIAL HX:     Smoking          ETOH/Illicit drugs          Occupation    PAST MEDICAL & SURGICAL HISTORY:  No pertinent past medical history      Parotid tumor  resection          FAMILY HISTORY:  :    No known cardiovascular or pulmonary family history     ROS:  See HPI     PHYSICAL EXAM    ICU Vital Signs Last 24 Hrs  T(C): 36.7 (04 Dec 2023 19:10), Max: 37.2 (04 Dec 2023 17:48)  T(F): 98.1 (04 Dec 2023 19:10), Max: 99 (04 Dec 2023 17:48)  HR: 124 (04 Dec 2023 19:10) (95 - 133)  BP: 100/63 (04 Dec 2023 19:10) (100/63 - 109/72)  BP(mean): --  ABP: --  ABP(mean): --  RR: 20 (04 Dec 2023 19:10) (18 - 22)  SpO2: 94% (04 Dec 2023 19:10) (88% - 95%)    O2 Parameters below as of 04 Dec 2023 19:10  Patient On (Oxygen Delivery Method): room air, electronic      General: Not in distress, thin appearing  HEENT:  NICHELLE  Lymphatic system: No LN  Lungs: Decreased breath sounds bilaterally  Cardiovascular: Tachycardic, regular rhythm, no murmurs rubs or gallops  Gastrointestinal: Soft, Positive BS  Musculoskeletal: No clubbing. Moves all extremities.  Skin: Warm.  Intact  Neurological: No motor or sensory deficit         LABS:                          13.5   14.79 )-----------( 291      ( 04 Dec 2023 13:29 )             41.2                                               12-04    136  |  98  |  14  ----------------------------<  123<H>  4.0   |  25  |  0.79    Ca    9.6      04 Dec 2023 13:29    TPro  7.9  /  Alb  3.5  /  TBili  0.4  /  DBili  x   /  AST  44<H>  /  ALT  25  /  AlkPhos  86  12-04      PT/INR - ( 04 Dec 2023 13:29 )   PT: 13.0 sec;   INR: 1.14          PTT - ( 04 Dec 2023 13:29 )  PTT:34.2 sec                                       Urinalysis Basic - ( 04 Dec 2023 15:33 )    Color: Yellow / Appearance: Clear / S.023 / pH: x  Gluc: x / Ketone: 15 mg/dL  / Bili: Negative / Urobili: 1.0 mg/dL   Blood: x / Protein: 100 mg/dL / Nitrite: Negative   Leuk Esterase: Small / RBC: 4 /HPF / WBC 9 /HPF   Sq Epi: x / Non Sq Epi: 15 /HPF / Bacteria: Negative /HPF                                                  LIVER FUNCTIONS - ( 04 Dec 2023 13:29 )  Alb: 3.5 g/dL / Pro: 7.9 g/dL / ALK PHOS: 86 U/L / ALT: 25 U/L / AST: 44 U/L / GGT: x                                                  Urinalysis with Rflx Culture (collected 04 Dec 2023 15:33)                                                                                           CXR:    ECHO:    MEDICATIONS  (STANDING):    MEDICATIONS  (PRN):         Patient is a 73y old  Female who presents with a chief complaint of     Consult reason: Respiratory failure require HFNC    HPI:  72 y/o f hx emphysema, HLD, osteoporosis presents c/o cough, congestion, sore throat x 1 week with sob for the past 2 days. Also feeling feverish at home but has not taken her temp at home. At baseline, patient states that she is able to walk without stopping to catch her breath, and is able to climb a flight of stairs without difficulty. She does not take any inhalers for her emphysema. Follows with Dr. Zapien in pulm. Has 30+ pack year smoking history, quit in . Pt denies chest pain, nausea, vomiting, diarrhea, abdominal pain, extremity swelling.    ED vitals: T 97.7F  HR 95 RR 20-22  /63 SpO2 88% on RA  Labs significant: WBC 14.79, AST 44. Lactate 1.0. RVP negative for RSV.   Imaging/EKG: CXR: hyperinflated lungs consistent with emphysema.  EKG: sinus tachycardia  Interventions: solumedrol 125mg x1, tylenol 975mg x1, duonebs x3, 1L NS      Allergies    No Known Allergies    Intolerances      Home Medications:      SOCIAL HX:     Smoking          ETOH/Illicit drugs          Occupation    PAST MEDICAL & SURGICAL HISTORY:  No pertinent past medical history      Parotid tumor  resection          FAMILY HISTORY:  :    No known cardiovascular or pulmonary family history     ROS:  See HPI     PHYSICAL EXAM    ICU Vital Signs Last 24 Hrs  T(C): 36.7 (04 Dec 2023 19:10), Max: 37.2 (04 Dec 2023 17:48)  T(F): 98.1 (04 Dec 2023 19:10), Max: 99 (04 Dec 2023 17:48)  HR: 124 (04 Dec 2023 19:10) (95 - 133)  BP: 100/63 (04 Dec 2023 19:10) (100/63 - 109/72)  BP(mean): --  ABP: --  ABP(mean): --  RR: 20 (04 Dec 2023 19:10) (18 - 22)  SpO2: 94% (04 Dec 2023 19:10) (88% - 95%)    O2 Parameters below as of 04 Dec 2023 19:10  Patient On (Oxygen Delivery Method): room air, electronic      General: Not in distress, thin appearing  HEENT:  NICHELLE  Lymphatic system: No LN  Lungs: Decreased breath sounds bilaterally  Cardiovascular: Tachycardic, regular rhythm, no murmurs rubs or gallops  Gastrointestinal: Soft, Positive BS  Musculoskeletal: No clubbing. Moves all extremities.  Skin: Warm.  Intact  Neurological: No motor or sensory deficit         LABS:                          13.5   14.79 )-----------( 291      ( 04 Dec 2023 13:29 )             41.2                                               12-04    136  |  98  |  14  ----------------------------<  123<H>  4.0   |  25  |  0.79    Ca    9.6      04 Dec 2023 13:29    TPro  7.9  /  Alb  3.5  /  TBili  0.4  /  DBili  x   /  AST  44<H>  /  ALT  25  /  AlkPhos  86  12-04      PT/INR - ( 04 Dec 2023 13:29 )   PT: 13.0 sec;   INR: 1.14          PTT - ( 04 Dec 2023 13:29 )  PTT:34.2 sec                                       Urinalysis Basic - ( 04 Dec 2023 15:33 )    Color: Yellow / Appearance: Clear / S.023 / pH: x  Gluc: x / Ketone: 15 mg/dL  / Bili: Negative / Urobili: 1.0 mg/dL   Blood: x / Protein: 100 mg/dL / Nitrite: Negative   Leuk Esterase: Small / RBC: 4 /HPF / WBC 9 /HPF   Sq Epi: x / Non Sq Epi: 15 /HPF / Bacteria: Negative /HPF                                                  LIVER FUNCTIONS - ( 04 Dec 2023 13:29 )  Alb: 3.5 g/dL / Pro: 7.9 g/dL / ALK PHOS: 86 U/L / ALT: 25 U/L / AST: 44 U/L / GGT: x                                                  Urinalysis with Rflx Culture (collected 04 Dec 2023 15:33)                                                                                           CXR:    ECHO:    MEDICATIONS  (STANDING):    MEDICATIONS  (PRN):         Patient is a 73y old  Female who presents with a chief complaint of     Consult reason: Respiratory failure require HFNC    HPI:  72 y/o f hx emphysema, HLD, osteoporosis presents c/o cough, congestion, sore throat x 1 week with sob for the past 2 days. Also feeling feverish at home but has not taken her temp at home. At baseline, patient states that she is able to walk without stopping to catch her breath, and is able to climb a flight of stairs without difficulty. She does not take any inhalers for her emphysema. Follows with Dr. Zapien in pulm. Has 30+ pack year smoking history, quit in . Pt denies chest pain, nausea, vomiting, diarrhea, abdominal pain, extremity swelling.    ED vitals: T 97.7, HR 95 -> 125, /63, RR 22, SpO2 88% on RA -> 95% on 4L NC  Labs significant: WBC 14.79, Hgb 13.5, plts 291. Coags unremarkable. CMP with glucose 123, AST 44, otherwise unremarkable. lactate 1.0. UA with protein and ketones. RSV + on RVP.   Imaging/EKG:   CXR: hyperinflated lungs with bilateral hazy opacities  EKG: sinus tachycardia  Interventions: 2L NS, tylenol 975mg x1, solumedrol 125mg x1, duonebs x3    Allergies    No Known Allergies    Intolerances      Home Medications:  Atorvastatin 20mg daily  Forteo injection daily  Lomotil for diarrhea    SOCIAL HX:     Smoking          ETOH/Illicit drugs          Occupation    PAST MEDICAL & SURGICAL HISTORY:  No pertinent past medical history      Parotid tumor  resection      FAMILY HISTORY:  :    No known cardiovascular or pulmonary family history     ROS:  See HPI     PHYSICAL EXAM    ICU Vital Signs Last 24 Hrs  T(C): 36.7 (04 Dec 2023 19:10), Max: 37.2 (04 Dec 2023 17:48)  T(F): 98.1 (04 Dec 2023 19:10), Max: 99 (04 Dec 2023 17:48)  HR: 124 (04 Dec 2023 19:10) (95 - 133)  BP: 100/63 (04 Dec 2023 19:10) (100/63 - 109/72)  BP(mean): --  ABP: --  ABP(mean): --  RR: 20 (04 Dec 2023 19:10) (18 - 22)  SpO2: 94% (04 Dec 2023 19:10) (88% - 95%)    O2 Parameters below as of 04 Dec 2023 19:10  Patient On (Oxygen Delivery Method): room air, electronic    General: Not in distress, some conversational dyspnea  HEENT: asymmetric movement of face 2/2 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  Moves all extremities.    Skin: Warm.  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), no other motor or sensory deficits        LABS:                          13.5   14.79 )-----------( 291      ( 04 Dec 2023 13:29 )             41.2                                               12-04    136  |  98  |  14  ----------------------------<  123<H>  4.0   |  25  |  0.79    Ca    9.6      04 Dec 2023 13:29    TPro  7.9  /  Alb  3.5  /  TBili  0.4  /  DBili  x   /  AST  44<H>  /  ALT  25  /  AlkPhos  86  12-04      PT/INR - ( 04 Dec 2023 13:29 )   PT: 13.0 sec;   INR: 1.14          PTT - ( 04 Dec 2023 13:29 )  PTT:34.2 sec                                       Urinalysis Basic - ( 04 Dec 999192:33 )    Color: Yellow / Appearance: Clear / S.023 / pH: x  Gluc: x / Ketone: 15 mg/dL  / Bili: Negative / Urobili: 1.0 mg/dL   Blood: x / Protein: 100 mg/dL / Nitrite: Negative   Leuk Esterase: Small / RBC: 4 /HPF / WBC 9 /HPF   Sq Epi: x / Non Sq Epi: 15 /HPF / Bacteria: Negative /HPF                                                  LIVER FUNCTIONS - ( 04 Dec 2023 13:29 )  Alb: 3.5 g/dL / Pro: 7.9 g/dL / ALK PHOS: 86 U/L / ALT: 25 U/L / AST: 44 U/L / GGT: x                                                  Urinalysis with Rflx Culture (collected 04 Dec 2023 15:33) Patient is a 73y old  Female who presents with a chief complaint of     Consult reason: Respiratory failure require HFNC    HPI:  72 y/o f hx emphysema, HLD, osteoporosis presents c/o cough, congestion, sore throat x 1 week with sob for the past 2 days. Also feeling feverish at home but has not taken her temp at home. At baseline, patient states that she is able to walk without stopping to catch her breath, and is able to climb a flight of stairs without difficulty. She does not take any inhalers for her emphysema. Follows with Dr. Zapien in pulm. Has 30+ pack year smoking history, quit in . Pt denies chest pain, nausea, vomiting, diarrhea, abdominal pain, extremity swelling.    ED vitals: T 97.7, HR 95 -> 125, /63, RR 22, SpO2 88% on RA -> 95% on 4L NC  Labs significant: WBC 14.79, Hgb 13.5, plts 291. Coags unremarkable. CMP with glucose 123, AST 44, otherwise unremarkable. lactate 1.0. UA with protein and ketones. RSV + on RVP.   Imaging/EKG:   CXR: hyperinflated lungs with bilateral hazy opacities  EKG: sinus tachycardia  Interventions: 2L NS, tylenol 975mg x1, solumedrol 125mg x1, duonebs x3    Allergies    No Known Allergies    Intolerances      Home Medications:  Atorvastatin 20mg daily  Forteo injection daily  Lomotil for diarrhea    SOCIAL HX:     Smoking          ETOH/Illicit drugs          Occupation    PAST MEDICAL & SURGICAL HISTORY:  No pertinent past medical history      Parotid tumor  resection      FAMILY HISTORY:  :    No known cardiovascular or pulmonary family history     ROS:  See HPI     PHYSICAL EXAM    ICU Vital Signs Last 24 Hrs  T(C): 36.7 (04 Dec 2023 19:10), Max: 37.2 (04 Dec 2023 17:48)  T(F): 98.1 (04 Dec 2023 19:10), Max: 99 (04 Dec 2023 17:48)  HR: 124 (04 Dec 2023 19:10) (95 - 133)  BP: 100/63 (04 Dec 2023 19:10) (100/63 - 109/72)  BP(mean): --  ABP: --  ABP(mean): --  RR: 20 (04 Dec 2023 19:10) (18 - 22)  SpO2: 94% (04 Dec 2023 19:10) (88% - 95%)    O2 Parameters below as of 04 Dec 2023 19:10  Patient On (Oxygen Delivery Method): room air, electronic    General: Not in distress, some conversational dyspnea  HEENT: asymmetric movement of face 2/2 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  Moves all extremities.    Skin: Warm.  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), no other motor or sensory deficits        LABS:                          13.5   14.79 )-----------( 291      ( 04 Dec 2023 13:29 )             41.2                                               12-04    136  |  98  |  14  ----------------------------<  123<H>  4.0   |  25  |  0.79    Ca    9.6      04 Dec 2023 13:29    TPro  7.9  /  Alb  3.5  /  TBili  0.4  /  DBili  x   /  AST  44<H>  /  ALT  25  /  AlkPhos  86  12-04      PT/INR - ( 04 Dec 2023 13:29 )   PT: 13.0 sec;   INR: 1.14          PTT - ( 04 Dec 2023 13:29 )  PTT:34.2 sec                                       Urinalysis Basic - ( 04 Dec 158005:33 )    Color: Yellow / Appearance: Clear / S.023 / pH: x  Gluc: x / Ketone: 15 mg/dL  / Bili: Negative / Urobili: 1.0 mg/dL   Blood: x / Protein: 100 mg/dL / Nitrite: Negative   Leuk Esterase: Small / RBC: 4 /HPF / WBC 9 /HPF   Sq Epi: x / Non Sq Epi: 15 /HPF / Bacteria: Negative /HPF                                                  LIVER FUNCTIONS - ( 04 Dec 2023 13:29 )  Alb: 3.5 g/dL / Pro: 7.9 g/dL / ALK PHOS: 86 U/L / ALT: 25 U/L / AST: 44 U/L / GGT: x                                                  Urinalysis with Rflx Culture (collected 04 Dec 2023 15:33)

## 2023-12-04 NOTE — H&P ADULT - ASSESSMENT
72 y/o f hx emphysema, HLD, osteoporosis presents c/o cough, congestion, sore throat x 1 week and SOB for 2 days, found to be in COPD exacerbation 2/2 RSV infection. ICU consulted for respiratory distress.

## 2023-12-04 NOTE — ED PROVIDER NOTE - OBJECTIVE STATEMENT
74 y/o f hx emphysema, HLD presents c/o cough x 1 week with sob.  Pt stating cough has been productive of whitish sputum, now having some nasal congestion today as well.  Pt has felt feverish, hasn't been taking her temp.  Denies CP, n/v/d, all other ROS negative. 72 y/o f hx emphysema, HLD presents c/o cough x 1 week with sob.  Pt stating cough has been productive of whitish sputum, now having some nasal congestion today as well.  Pt has felt feverish, hasn't been taking her temp.  Denies CP, n/v/d, all other ROS negative.

## 2023-12-04 NOTE — PATIENT PROFILE ADULT - FUNCTIONAL ASSESSMENT - BASIC MOBILITY 6.
4-calculated by average/Not able to assess (calculate score using [facility] averaging method)  4-calculated by average/Not able to assess (calculate score using [facility] averaging method)

## 2023-12-04 NOTE — H&P ADULT - HISTORY OF PRESENT ILLNESS
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 She has also been feeling feverish at home but has not taken her temp at home. At baseline, the patient states that she is able to walk without stopping to catch her breath, and is able to climb a flight of stairs without difficulty. She does not take any inhalers for her emphysema. Follows with Dr. Zapien in pulm. Has 30+ pack year smoking history, quit in 2014. Pt denies chest pain, nausea, vomiting, diarrhea, abdominal pain, extremity swelling.    ED Course:  V/S:  Temp: 98.1, HR: 124, BP: 100/63, RR: 20, SpO2: 94 RA  Labs:  WBC: 14.79, Glu: 123, AST: 44,   Urine: Protein 100, Ketone: 15, Blood: small, Leuk est: small, WBC: 9, cast: 19, epithelial cells: 19  RSV: Detected; COVID: Not detected  EKG:   Sinus rhythm with fusion complexes  Imaging:  Chest x-ray: Suspected emphysema. Subtle opacities left basal. Findings can be correlated with CT chest.  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 She has also been feeling feverish at home but has not taken her temp at home. At baseline, the patient states that she is able to walk without stopping to catch her breath, and is able to climb a flight of stairs without difficulty. She does not take any inhalers for her emphysema. Follows with Dr. Zapien in pulm. Has 30+ pack year smoking history, quit in 2014. Pt denies chest pain, nausea, vomiting, diarrhea, abdominal pain, extremity swelling.    ED Course:  V/S:  Temp: 98.1, HR: 124, BP: 100/63, RR: 20, SpO2: 94 RA  Labs:  WBC: 14.79, Glu: 123, AST: 44,   Urine: Protein 100, Ketone: 15, Blood: small, Leuk est: small, WBC: 9, cast: 19, epithelial cells: 19  RSV: Detected; COVID: Not detected  EKG:   Sinus rhythm with fusion complexes  Imaging:  Chest x-ray: Suspected emphysema. Subtle opacities left basal. Findings can be correlated with CT chest.  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 She has also been feeling feverish at home but has not taken her temp at home. At baseline, the patient states that she is able to walk without stopping to catch her breath, and is able to climb a flight of stairs without difficulty. She does not take any inhalers for her emphysema. Follows with Dr. Zapien in pulm. Has 30+ pack year smoking history, quit in 2014. Pt denies chest pain, nausea, vomiting, diarrhea, abdominal pain, extremity swelling.    ED Course:  V/S:  Temp: 98.1, HR: 124, BP: 100/63, RR: 20, SpO2: 94 RA  Labs:  WBC: 14.79, Glu: 123, AST: 44,   Urine: Protein 100, Ketone: 15, Blood: small, Leuk est: small, WBC: 9, cast: 19, epithelial cells: 19  RSV: Detected; COVID: Not detected  EKG:   Sinus rhythm with fusion complexes  Imaging:  Chest x-ray: Suspected emphysema. Subtle opacities left basal. Findings can be correlated with CT chest.  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 She has also been feeling feverish at home but has not taken her temp at home. At baseline, the patient states that she is able to walk without stopping to catch her breath, and is able to climb a flight of stairs without difficulty. She does not take any inhalers for her emphysema. Follows with Dr. Zapien in pulm. Has 30+ pack year smoking history, quit in 2014. Pt denies chest pain, nausea, vomiting, diarrhea, abdominal pain, constipation, extremity swelling.    ED Course:  V/S:  Temp: 98.1, HR: 124, BP: 100/63, RR: 20, SpO2: 94 RA  Labs:  WBC: 14.79, Glu: 123, AST: 44,   Urine: Protein 100, Ketone: 15, Blood: small, Leuk est: small, WBC: 9, cast: 19, epithelial cells: 19  RSV: Detected; COVID: Not detected  EKG:   Sinus rhythm with fusion complexes  Imaging:  Chest x-ray: Suspected emphysema. Subtle opacities left basal. Findings can be correlated with CT chest.  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 She has also been feeling feverish at home but has not taken her temp at home. At baseline, the patient states that she is able to walk without stopping to catch her breath, and is able to climb a flight of stairs without difficulty. She does not take any inhalers for her emphysema. Follows with Dr. Zapien in pulm. Has 30+ pack year smoking history, quit in 2014. Pt denies chest pain, nausea, vomiting, diarrhea, abdominal pain, constipation, extremity swelling.    ED Course:  V/S:  Temp: 98.1, HR: 124, BP: 100/63, RR: 20, SpO2: 94 RA  Labs:  WBC: 14.79, Glu: 123, AST: 44,   Urine: Protein 100, Ketone: 15, Blood: small, Leuk est: small, WBC: 9, cast: 19, epithelial cells: 19  RSV: Detected; COVID: Not detected  EKG:   Sinus rhythm with fusion complexes  Imaging:  Chest x-ray: Suspected emphysema. Subtle opacities left basal. Findings can be correlated with CT chest.  Interventions:  - Duoneb x 3  - NS 1L bolus x 1  - Solumedrol 125mg IVP x 1  - Tylenol 975mg PO x 1

## 2023-12-04 NOTE — ED ADULT NURSE REASSESSMENT NOTE - NS ED NURSE REASSESS COMMENT FT1
ICU consult does not want patient on high flow but wants high flow at bedside. Patient on nasal cannula.

## 2023-12-04 NOTE — ED PROVIDER NOTE - CLINICAL SUMMARY MEDICAL DECISION MAKING FREE TEXT BOX
72 y/o f hx emphysema, HLD presents c/o cough, sob over the past week.  Pt afebrile in ED, dyspneic on exam with coarse breath sounds, no wheezing.  O2 sat 90% on RA, improved with nasal cannula.  Pt given nebs and steroids, still sob, rsv +.  Will admit. 74 y/o f hx emphysema, HLD presents c/o cough, sob over the past week.  Pt afebrile in ED, dyspneic on exam with coarse breath sounds, no wheezing.  O2 sat 90% on RA, improved with nasal cannula.  Pt given nebs and steroids, still sob, rsv +.  Will admit.

## 2023-12-05 LAB
ALBUMIN SERPL ELPH-MCNC: 3 G/DL — LOW (ref 3.3–5)
ALBUMIN SERPL ELPH-MCNC: 3 G/DL — LOW (ref 3.3–5)
ALP SERPL-CCNC: 80 U/L — SIGNIFICANT CHANGE UP (ref 40–120)
ALP SERPL-CCNC: 80 U/L — SIGNIFICANT CHANGE UP (ref 40–120)
ALT FLD-CCNC: 24 U/L — SIGNIFICANT CHANGE UP (ref 10–45)
ALT FLD-CCNC: 24 U/L — SIGNIFICANT CHANGE UP (ref 10–45)
ANION GAP SERPL CALC-SCNC: 11 MMOL/L — SIGNIFICANT CHANGE UP (ref 5–17)
ANION GAP SERPL CALC-SCNC: 11 MMOL/L — SIGNIFICANT CHANGE UP (ref 5–17)
ANISOCYTOSIS BLD QL: SLIGHT — SIGNIFICANT CHANGE UP
ANISOCYTOSIS BLD QL: SLIGHT — SIGNIFICANT CHANGE UP
AST SERPL-CCNC: 33 U/L — SIGNIFICANT CHANGE UP (ref 10–40)
AST SERPL-CCNC: 33 U/L — SIGNIFICANT CHANGE UP (ref 10–40)
BASOPHILS # BLD AUTO: 0 K/UL — SIGNIFICANT CHANGE UP (ref 0–0.2)
BASOPHILS # BLD AUTO: 0 K/UL — SIGNIFICANT CHANGE UP (ref 0–0.2)
BASOPHILS NFR BLD AUTO: 0 % — SIGNIFICANT CHANGE UP (ref 0–2)
BASOPHILS NFR BLD AUTO: 0 % — SIGNIFICANT CHANGE UP (ref 0–2)
BILIRUB SERPL-MCNC: 0.2 MG/DL — SIGNIFICANT CHANGE UP (ref 0.2–1.2)
BILIRUB SERPL-MCNC: 0.2 MG/DL — SIGNIFICANT CHANGE UP (ref 0.2–1.2)
BUN SERPL-MCNC: 10 MG/DL — SIGNIFICANT CHANGE UP (ref 7–23)
BUN SERPL-MCNC: 10 MG/DL — SIGNIFICANT CHANGE UP (ref 7–23)
CALCIUM SERPL-MCNC: 9.1 MG/DL — SIGNIFICANT CHANGE UP (ref 8.4–10.5)
CALCIUM SERPL-MCNC: 9.1 MG/DL — SIGNIFICANT CHANGE UP (ref 8.4–10.5)
CHLORIDE SERPL-SCNC: 102 MMOL/L — SIGNIFICANT CHANGE UP (ref 96–108)
CHLORIDE SERPL-SCNC: 102 MMOL/L — SIGNIFICANT CHANGE UP (ref 96–108)
CO2 SERPL-SCNC: 24 MMOL/L — SIGNIFICANT CHANGE UP (ref 22–31)
CO2 SERPL-SCNC: 24 MMOL/L — SIGNIFICANT CHANGE UP (ref 22–31)
CREAT SERPL-MCNC: 0.62 MG/DL — SIGNIFICANT CHANGE UP (ref 0.5–1.3)
CREAT SERPL-MCNC: 0.62 MG/DL — SIGNIFICANT CHANGE UP (ref 0.5–1.3)
EGFR: 95 ML/MIN/1.73M2 — SIGNIFICANT CHANGE UP
EGFR: 95 ML/MIN/1.73M2 — SIGNIFICANT CHANGE UP
EOSINOPHIL # BLD AUTO: 0 K/UL — SIGNIFICANT CHANGE UP (ref 0–0.5)
EOSINOPHIL # BLD AUTO: 0 K/UL — SIGNIFICANT CHANGE UP (ref 0–0.5)
EOSINOPHIL NFR BLD AUTO: 0 % — SIGNIFICANT CHANGE UP (ref 0–6)
EOSINOPHIL NFR BLD AUTO: 0 % — SIGNIFICANT CHANGE UP (ref 0–6)
GLUCOSE SERPL-MCNC: 243 MG/DL — HIGH (ref 70–99)
GLUCOSE SERPL-MCNC: 243 MG/DL — HIGH (ref 70–99)
HCT VFR BLD CALC: 34.4 % — LOW (ref 34.5–45)
HCT VFR BLD CALC: 34.4 % — LOW (ref 34.5–45)
HCV AB S/CO SERPL IA: 0.04 S/CO — SIGNIFICANT CHANGE UP
HCV AB S/CO SERPL IA: 0.04 S/CO — SIGNIFICANT CHANGE UP
HCV AB SERPL-IMP: SIGNIFICANT CHANGE UP
HCV AB SERPL-IMP: SIGNIFICANT CHANGE UP
HGB BLD-MCNC: 11.4 G/DL — LOW (ref 11.5–15.5)
HGB BLD-MCNC: 11.4 G/DL — LOW (ref 11.5–15.5)
LEGIONELLA AG UR QL: NEGATIVE — SIGNIFICANT CHANGE UP
LEGIONELLA AG UR QL: NEGATIVE — SIGNIFICANT CHANGE UP
LYMPHOCYTES # BLD AUTO: 0.63 K/UL — LOW (ref 1–3.3)
LYMPHOCYTES # BLD AUTO: 0.63 K/UL — LOW (ref 1–3.3)
LYMPHOCYTES # BLD AUTO: 3.5 % — LOW (ref 13–44)
LYMPHOCYTES # BLD AUTO: 3.5 % — LOW (ref 13–44)
MAGNESIUM SERPL-MCNC: 2 MG/DL — SIGNIFICANT CHANGE UP (ref 1.6–2.6)
MAGNESIUM SERPL-MCNC: 2 MG/DL — SIGNIFICANT CHANGE UP (ref 1.6–2.6)
MANUAL SMEAR VERIFICATION: SIGNIFICANT CHANGE UP
MANUAL SMEAR VERIFICATION: SIGNIFICANT CHANGE UP
MCHC RBC-ENTMCNC: 28.8 PG — SIGNIFICANT CHANGE UP (ref 27–34)
MCHC RBC-ENTMCNC: 28.8 PG — SIGNIFICANT CHANGE UP (ref 27–34)
MCHC RBC-ENTMCNC: 33.1 GM/DL — SIGNIFICANT CHANGE UP (ref 32–36)
MCHC RBC-ENTMCNC: 33.1 GM/DL — SIGNIFICANT CHANGE UP (ref 32–36)
MCV RBC AUTO: 86.9 FL — SIGNIFICANT CHANGE UP (ref 80–100)
MCV RBC AUTO: 86.9 FL — SIGNIFICANT CHANGE UP (ref 80–100)
MONOCYTES # BLD AUTO: 1.1 K/UL — HIGH (ref 0–0.9)
MONOCYTES # BLD AUTO: 1.1 K/UL — HIGH (ref 0–0.9)
MONOCYTES NFR BLD AUTO: 6.1 % — SIGNIFICANT CHANGE UP (ref 2–14)
MONOCYTES NFR BLD AUTO: 6.1 % — SIGNIFICANT CHANGE UP (ref 2–14)
NEUTROPHILS # BLD AUTO: 16.34 K/UL — HIGH (ref 1.8–7.4)
NEUTROPHILS # BLD AUTO: 16.34 K/UL — HIGH (ref 1.8–7.4)
NEUTROPHILS NFR BLD AUTO: 86.1 % — HIGH (ref 43–77)
NEUTROPHILS NFR BLD AUTO: 86.1 % — HIGH (ref 43–77)
NEUTS BAND # BLD: 4.3 % — SIGNIFICANT CHANGE UP (ref 0–8)
NEUTS BAND # BLD: 4.3 % — SIGNIFICANT CHANGE UP (ref 0–8)
OVALOCYTES BLD QL SMEAR: SLIGHT — SIGNIFICANT CHANGE UP
OVALOCYTES BLD QL SMEAR: SLIGHT — SIGNIFICANT CHANGE UP
PHOSPHATE SERPL-MCNC: 2 MG/DL — LOW (ref 2.5–4.5)
PHOSPHATE SERPL-MCNC: 2 MG/DL — LOW (ref 2.5–4.5)
PLAT MORPH BLD: NORMAL — SIGNIFICANT CHANGE UP
PLAT MORPH BLD: NORMAL — SIGNIFICANT CHANGE UP
PLATELET # BLD AUTO: 278 K/UL — SIGNIFICANT CHANGE UP (ref 150–400)
PLATELET # BLD AUTO: 278 K/UL — SIGNIFICANT CHANGE UP (ref 150–400)
POLYCHROMASIA BLD QL SMEAR: SLIGHT — SIGNIFICANT CHANGE UP
POLYCHROMASIA BLD QL SMEAR: SLIGHT — SIGNIFICANT CHANGE UP
POTASSIUM SERPL-MCNC: 3.7 MMOL/L — SIGNIFICANT CHANGE UP (ref 3.5–5.3)
POTASSIUM SERPL-MCNC: 3.7 MMOL/L — SIGNIFICANT CHANGE UP (ref 3.5–5.3)
POTASSIUM SERPL-SCNC: 3.7 MMOL/L — SIGNIFICANT CHANGE UP (ref 3.5–5.3)
POTASSIUM SERPL-SCNC: 3.7 MMOL/L — SIGNIFICANT CHANGE UP (ref 3.5–5.3)
PROCALCITONIN SERPL-MCNC: 1.17 NG/ML — HIGH (ref 0.02–0.1)
PROCALCITONIN SERPL-MCNC: 1.17 NG/ML — HIGH (ref 0.02–0.1)
PROT SERPL-MCNC: 7.1 G/DL — SIGNIFICANT CHANGE UP (ref 6–8.3)
PROT SERPL-MCNC: 7.1 G/DL — SIGNIFICANT CHANGE UP (ref 6–8.3)
RBC # BLD: 3.96 M/UL — SIGNIFICANT CHANGE UP (ref 3.8–5.2)
RBC # BLD: 3.96 M/UL — SIGNIFICANT CHANGE UP (ref 3.8–5.2)
RBC # FLD: 13.6 % — SIGNIFICANT CHANGE UP (ref 10.3–14.5)
RBC # FLD: 13.6 % — SIGNIFICANT CHANGE UP (ref 10.3–14.5)
RBC BLD AUTO: ABNORMAL
RBC BLD AUTO: ABNORMAL
S PNEUM AG UR QL: NEGATIVE — SIGNIFICANT CHANGE UP
S PNEUM AG UR QL: NEGATIVE — SIGNIFICANT CHANGE UP
SODIUM SERPL-SCNC: 137 MMOL/L — SIGNIFICANT CHANGE UP (ref 135–145)
SODIUM SERPL-SCNC: 137 MMOL/L — SIGNIFICANT CHANGE UP (ref 135–145)
WBC # BLD: 18.08 K/UL — HIGH (ref 3.8–10.5)
WBC # BLD: 18.08 K/UL — HIGH (ref 3.8–10.5)
WBC # FLD AUTO: 18.08 K/UL — HIGH (ref 3.8–10.5)
WBC # FLD AUTO: 18.08 K/UL — HIGH (ref 3.8–10.5)

## 2023-12-05 PROCEDURE — 99233 SBSQ HOSP IP/OBS HIGH 50: CPT | Mod: GC

## 2023-12-05 RX ORDER — POTASSIUM CHLORIDE 20 MEQ
20 PACKET (EA) ORAL ONCE
Refills: 0 | Status: COMPLETED | OUTPATIENT
Start: 2023-12-05 | End: 2023-12-05

## 2023-12-05 RX ORDER — PANTOPRAZOLE SODIUM 20 MG/1
40 TABLET, DELAYED RELEASE ORAL
Refills: 0 | Status: DISCONTINUED | OUTPATIENT
Start: 2023-12-05 | End: 2023-12-10

## 2023-12-05 RX ADMIN — Medication 62.5 MILLIMOLE(S): at 09:36

## 2023-12-05 RX ADMIN — Medication 3 MILLILITER(S): at 14:06

## 2023-12-05 RX ADMIN — AZITHROMYCIN 255 MILLIGRAM(S): 500 TABLET, FILM COATED ORAL at 19:48

## 2023-12-05 RX ADMIN — Medication 40 MILLIGRAM(S): at 07:20

## 2023-12-05 RX ADMIN — Medication 3 MILLILITER(S): at 18:11

## 2023-12-05 RX ADMIN — Medication 3 MILLILITER(S): at 09:36

## 2023-12-05 RX ADMIN — PANTOPRAZOLE SODIUM 40 MILLIGRAM(S): 20 TABLET, DELAYED RELEASE ORAL at 07:20

## 2023-12-05 RX ADMIN — Medication 3 MILLILITER(S): at 01:42

## 2023-12-05 RX ADMIN — ENOXAPARIN SODIUM 40 MILLIGRAM(S): 100 INJECTION SUBCUTANEOUS at 19:49

## 2023-12-05 RX ADMIN — Medication 3 MILLILITER(S): at 05:44

## 2023-12-05 RX ADMIN — CEFTRIAXONE 100 MILLIGRAM(S): 500 INJECTION, POWDER, FOR SOLUTION INTRAMUSCULAR; INTRAVENOUS at 19:13

## 2023-12-05 RX ADMIN — Medication 20 MILLIEQUIVALENT(S): at 07:20

## 2023-12-05 RX ADMIN — Medication 3 MILLILITER(S): at 22:22

## 2023-12-05 NOTE — DIETITIAN INITIAL EVALUATION ADULT - OTHER INFO
- Currently ordered for prednisone   - Nutrition Pertinent Labs 12/5: Gluc: 243(H), Phos: 2.0 (H)  - Hypophosphatemia; addressed with sodium phosphate IV

## 2023-12-05 NOTE — DIETITIAN INITIAL EVALUATION ADULT - ORAL INTAKE PTA/DIET HISTORY
Visited pt at bedside on 5LA. Pt states that baseline she has 3 meals a day and with good appetite. In the past week, she had a decrease in appetite. Does not follow any therapeutic diet at home. No cultural, ethnic, Christianity food preferences noted. Confirms No known food allergies.  Visited pt at bedside on 5LA. Pt states that baseline she has 3 meals a day and with good appetite. In the past week, she had a decrease in appetite. Does not follow any therapeutic diet at home. No cultural, ethnic, Worship food preferences noted. Confirms No known food allergies.

## 2023-12-05 NOTE — DIETITIAN NUTRITION RISK NOTIFICATION - TREATMENT: THE FOLLOWING DIET HAS BEEN RECOMMENDED
Diet, Regular:   Supplement Feeding Modality:  Oral  Ensure Max Cans or Servings Per Day:  1       Frequency:  Three Times a day (12-05-23 @ 09:52) [Active]

## 2023-12-05 NOTE — PROGRESS NOTE ADULT - PROBLEM SELECTOR PLAN 6
Fluids: Dry on exam, give additional 500cc LR  Electrolytes: Replete as needed  Nutrition: regular diet  Dvt ppx: Lovenox 40mg q24  Code status: FULL code    Dispo: 7 [facility] Fluids: 500cc LR  Electrolytes: Replete as needed  Nutrition: regular diet  Dvt ppx: Lovenox 40mg q24  Code status: FULL code  Dispo: 7 [facility]

## 2023-12-05 NOTE — DIETITIAN INITIAL EVALUATION ADULT - ADD RECOMMEND
1. Continue current diet as tolerated: Regular. Fluids defered to medical team. Defer diet texture/fluid consistencies to team.   2. Continue oral nutrition supplement: Ensure Max 3x/day (provides 150kcal, 20g pro in each) to optimize intake   3. Encourage PO intake and honor food preferences as able unless otherwise contraindicated.   4. Monitor PO intake, diet tolerance, weight trends, labs, and skin integrity and bowel movement regularity.   5. RD remains available upon request and will follow-up per protocol.   6. Malnutrition sticker placed in charts.  1. Continue current diet as tolerated: Regular. Fluids deferred to medical team. Defer diet texture/fluid consistencies to team.   2. Continue oral nutrition supplement: Ensure Plus High protein daily 3x/day (350 kcal, 20 g protein in each) to optimize intake.  3. Encourage PO intake and honor food preferences as able unless otherwise contraindicated.   4. Monitor PO intake, diet tolerance, weight trends, labs, and skin integrity and bowel movement regularity.   5. RD remains available upon request and will follow-up per protocol.   6. Malnutrition sticker placed in charts.

## 2023-12-05 NOTE — DIETITIAN INITIAL EVALUATION ADULT - OTHER CALCULATIONS
Based on Standards of Care pt within % IBW (89%) thus actual body weight (40.3kg) used for all calculations. Needs adjusted for advanced age and malnutrition, COPD. Fluid recs per team.

## 2023-12-05 NOTE — PROGRESS NOTE ADULT - ASSESSMENT
72 y/o f hx emphysema, HLD, osteoporosis presents c/o cough, congestion, sore throat x 1 week and SOB for 2 days, found to be in COPD exacerbation 2/2 RSV infection. ICU consulted for respiratory distress. 72 y/o f hx emphysema, HLD, osteoporosis presents c/o cough, congestion, sore throat x 1 week and SOB for 2 days, found to be in COPD exacerbation 2/2 RSV infection with superimposed CAP.

## 2023-12-05 NOTE — DIETITIAN INITIAL EVALUATION ADULT - REASON FOR ADMISSION
ACUTE DYSPNEA  "74 y/o f hx emphysema, HLD, osteoporosis presents c/o cough, congestion, sore throat x 1 week and SOB for 2 days, found to be in COPD exacerbation 2/2 RSV infection. ICU consulted for respiratory distress."   ACUTE DYSPNEA  "72 y/o f hx emphysema, HLD, osteoporosis presents c/o cough, congestion, sore throat x 1 week and SOB for 2 days, found to be in COPD exacerbation 2/2 RSV infection. ICU consulted for respiratory distress."

## 2023-12-05 NOTE — DIETITIAN NUTRITION RISK NOTIFICATION - ADDITIONAL COMMENTS/DIETITIAN RECOMMENDATIONS
Severe Chronic Malnutrition RT Inadequate oral intake insetting of increased demand for nutrients AEB moderate/severe muscle and fat depletions, COPD, BMI<19      1. Continue current diet as tolerated: Regular. Fluids defered to medical team. Defer diet texture/fluid consistencies to team.   2. Continue oral nutrition supplement: Ensure Max 3x/day (provides 150kcal, 20g pro in each) to optimize intake   3. Encourage PO intake and honor food preferences as able unless otherwise contraindicated.   4. Monitor PO intake, diet tolerance, weight trends, labs, and skin integrity and bowel movement regularity.   5. RD remains available upon request and will follow-up per protocol.   6. Malnutrition sticker placed in charts.

## 2023-12-05 NOTE — DIETITIAN INITIAL EVALUATION ADULT - ENERGY INTAKE
Pt is tolerating current diet. States that this morning she consumed <50% of her meals.  Fair (50-75%)

## 2023-12-05 NOTE — PROGRESS NOTE ADULT - PROBLEM SELECTOR PLAN 1
Pt with known severe emphysema, asymptomatic at baseline not on maintenance inhalers. Follows with Dr. Zapien. Baseline O2 >95%. RSV positive in ED, likely trigger for COPD exacerbation  - C/w prednisone 40mg daily  - C/w duonebs standing q6hrs  - Start ceftriaxone 1g q24hrs, azithromycin 500mg q24hrs  - F/u urine legionella, urine strep  - F/u MRSA swab  - C/w NC for supplemental O2, goal SpO2 88-92%. If patient becomes more hypoxic/increased work of breathing, can start HFNC at 30/40. Pt with known severe emphysema, asymptomatic at baseline not on maintenance inhalers. Follows with Dr. Zapien. Baseline O2 >95%. RSV positive in ED, likely trigger for COPD exacerbation. Not on home o2  Legionella mrsa and strep negative    - C/w prednisone 40mg daily  - C/w duonebs standing q4  - c/w ceftriaxone 1g q24hrs, azithromycin 500mg q24hrs  - C/w NC 3L for supplemental O2, goal SpO2 88-92%.

## 2023-12-05 NOTE — DIETITIAN INITIAL EVALUATION ADULT - PERTINENT MEDS FT
MEDICATIONS  (STANDING):  albuterol/ipratropium for Nebulization 3 milliLiter(s) Nebulizer every 4 hours  azithromycin  IVPB      azithromycin  IVPB 500 milliGRAM(s) IV Intermittent every 24 hours  budesonide 160 MICROgram(s)/formoterol 4.5 MICROgram(s) Inhaler 2 Puff(s) Inhalation two times a day  cefTRIAXone   IVPB 1000 milliGRAM(s) IV Intermittent every 24 hours  enoxaparin Injectable 40 milliGRAM(s) SubCutaneous every 24 hours  pantoprazole    Tablet 40 milliGRAM(s) Oral before breakfast  predniSONE   Tablet 40 milliGRAM(s) Oral every 24 hours    MEDICATIONS  (PRN):  acetaminophen     Tablet .. 975 milliGRAM(s) Oral every 6 hours PRN Temp greater or equal to 38C (100.4F), Mild Pain (1 - 3)

## 2023-12-05 NOTE — DIETITIAN INITIAL EVALUATION ADULT - PHYSCIAL ASSESSMENT
Weight History:  12/4 89 pounds (dosing)   Per HIE:   11/29 92 pounds     UBW: ~93 pounds (per pt) x 6 months   IBW: 100 pounds   %IBW: 89%

## 2023-12-05 NOTE — DIETITIAN INITIAL EVALUATION ADULT - PERTINENT LABORATORY DATA
12-05    137  |  102  |  10  ----------------------------<  243<H>  3.7   |  24  |  0.62    Ca    9.1      05 Dec 2023 05:30  Phos  2.0     12-05  Mg     2.0     12-05    TPro  7.1  /  Alb  3.0<L>  /  TBili  0.2  /  DBili  x   /  AST  33  /  ALT  24  /  AlkPhos  80  12-05  POCT Blood Glucose.: 121 mg/dL (12-04-23 @ 13:26)

## 2023-12-05 NOTE — PROGRESS NOTE ADULT - SUBJECTIVE AND OBJECTIVE BOX
*****INCOMPLETE NOTE*****    INTERVAL HPI/OVERNIGHT EVENTS:    SUBJECTIVE: Patient seen and examined at bedside, resting comfortably in bed, and does not appear to be in any acute distress. Patient states  Patient denies any recent fevers, chills, nausea, vomiting, headache, acute sob, chest pain, abdominal pain, genitourinary sx, extremity pain or swelling.     ANTIBIOTICS/RELEVANT:    MEDICATIONS  (STANDING):  albuterol/ipratropium for Nebulization 3 milliLiter(s) Nebulizer every 4 hours  azithromycin  IVPB      azithromycin  IVPB 500 milliGRAM(s) IV Intermittent every 24 hours  budesonide 160 MICROgram(s)/formoterol 4.5 MICROgram(s) Inhaler 2 Puff(s) Inhalation two times a day  cefTRIAXone   IVPB 1000 milliGRAM(s) IV Intermittent every 24 hours  enoxaparin Injectable 40 milliGRAM(s) SubCutaneous every 24 hours  predniSONE   Tablet 40 milliGRAM(s) Oral every 24 hours    MEDICATIONS  (PRN):  acetaminophen     Tablet .. 975 milliGRAM(s) Oral every 6 hours PRN Temp greater or equal to 38C (100.4F), Mild Pain (1 - 3)      Vital Signs Last 24 Hrs  T(C): 37.1 (04 Dec 2023 21:45), Max: 37.2 (04 Dec 2023 17:48)  T(F): 98.8 (04 Dec 2023 21:45), Max: 99 (04 Dec 2023 17:48)  HR: 103 (05 Dec 2023 05:45) (95 - 133)  BP: 101/55 (05 Dec 2023 05:45) (100/63 - 118/64)  BP(mean): 73 (05 Dec 2023 05:45) (73 - 82)  RR: 20 (05 Dec 2023 05:45) (18 - 23)  SpO2: 97% (05 Dec 2023 05:45) (88% - 99%)    Parameters below as of 05 Dec 2023 05:45  Patient On (Oxygen Delivery Method): nasal cannula  O2 Flow (L/min): 4      PHYSICAL EXAM:  General: in no acute distress  Eyes: EOMI intact bilaterally. Anicteric sclerae, moist conjunctivae  HENT: Moist mucous membranes  Neck: Trachea midline, supple  Lungs: CTA B/L. No wheezes, rales, or rhonchi  Cardiovascular: RRR. No murmurs, rubs, or gallops  Abdomen: Soft, non-tender non-distended; No rebound or guarding  Extremities: WWP, No clubbing, cyanosis or edema  Neurological: Alert and oriented  Skin: Warm and dry. No obvious rash     LABS:                        13.5   14.79 )-----------( 291      ( 04 Dec 2023 13:29 )             41.2     12-04    136  |  98  |  14  ----------------------------<  123<H>  4.0   |  25  |  0.79    Ca    9.6      04 Dec 2023 13:29    TPro  7.9  /  Alb  3.5  /  TBili  0.4  /  DBili  x   /  AST  44<H>  /  ALT  25  /  AlkPhos  86  12-04    PT/INR - ( 04 Dec 2023 13:29 )   PT: 13.0 sec;   INR: 1.14          PTT - ( 04 Dec 2023 13:29 )  PTT:34.2 sec  Urinalysis Basic - ( 04 Dec 2023 15:33 )    Color: Yellow / Appearance: Clear / S.023 / pH: x  Gluc: x / Ketone: 15 mg/dL  / Bili: Negative / Urobili: 1.0 mg/dL   Blood: x / Protein: 100 mg/dL / Nitrite: Negative   Leuk Esterase: Small / RBC: 4 /HPF / WBC 9 /HPF   Sq Epi: x / Non Sq Epi: 15 /HPF / Bacteria: Negative /HPF        MICROBIOLOGY:    RADIOLOGY & ADDITIONAL STUDIES: INTERVAL HPI/OVERNIGHT EVENTS: maddie    SUBJECTIVE: Patient seen and examined at bedside, resting comfortably in bed, and does not appear to be in any acute distress. Patient states that she has mild shortness of breath, denies any chest pain or abdominal pain.    MEDICATIONS  (STANDING):  albuterol/ipratropium for Nebulization 3 milliLiter(s) Nebulizer every 4 hours  azithromycin  IVPB      azithromycin  IVPB 500 milliGRAM(s) IV Intermittent every 24 hours  budesonide 160 MICROgram(s)/formoterol 4.5 MICROgram(s) Inhaler 2 Puff(s) Inhalation two times a day  cefTRIAXone   IVPB 1000 milliGRAM(s) IV Intermittent every 24 hours  enoxaparin Injectable 40 milliGRAM(s) SubCutaneous every 24 hours  predniSONE   Tablet 40 milliGRAM(s) Oral every 24 hours    MEDICATIONS  (PRN):  acetaminophen     Tablet .. 975 milliGRAM(s) Oral every 6 hours PRN Temp greater or equal to 38C (100.4F), Mild Pain (1 - 3)      Vital Signs Last 24 Hrs  T(C): 37.1 (04 Dec 2023 21:45), Max: 37.2 (04 Dec 2023 17:48)  T(F): 98.8 (04 Dec 2023 21:45), Max: 99 (04 Dec 2023 17:48)  HR: 103 (05 Dec 2023 05:45) (95 - 133)  BP: 101/55 (05 Dec 2023 05:45) (100/63 - 118/64)  BP(mean): 73 (05 Dec 2023 05:45) (73 - 82)  RR: 20 (05 Dec 2023 05:45) (18 - 23)  SpO2: 97% (05 Dec 2023 05:45) (88% - 99%)    Parameters below as of 05 Dec 2023 05:45  Patient On (Oxygen Delivery Method): nasal cannula  O2 Flow (L/min): 4      PHYSICAL EXAM:  General: in no acute distress  Eyes: EOMI intact bilaterally, pupils 6mm bilateral and briskly reactive to light  HENT: Moist mucous membranes  Neck: Trachea midline  Lungs: crackles of right lower base, no wheezes  Cardiovascular: tachycardic  Abdomen: Soft, non-tender non-distended  Extremities: mildly cool to touch, wwp  Neurological: Alert and oriented, isolated left facial nerve palsy  Skin: Warm and dry    LABS:                        13.5   14.79 )-----------( 291      ( 04 Dec 2023 13:29 )             41.2     12-    136  |  98  |  14  ----------------------------<  123<H>  4.0   |  25  |  0.79    Ca    9.6      04 Dec 2023 13:29    TPro  7.9  /  Alb  3.5  /  TBili  0.4  /  DBili  x   /  AST  44<H>  /  ALT  25  /  AlkPhos  86  12-04    PT/INR - ( 04 Dec 2023 13:29 )   PT: 13.0 sec;   INR: 1.14          PTT - ( 04 Dec 2023 13:29 )  PTT:34.2 sec  Urinalysis Basic - ( 04 Dec 2023 15:33 )    Color: Yellow / Appearance: Clear / S.023 / pH: x  Gluc: x / Ketone: 15 mg/dL  / Bili: Negative / Urobili: 1.0 mg/dL   Blood: x / Protein: 100 mg/dL / Nitrite: Negative   Leuk Esterase: Small / RBC: 4 /HPF / WBC 9 /HPF   Sq Epi: x / Non Sq Epi: 15 /HPF / Bacteria: Negative /HPF        MICROBIOLOGY:    RADIOLOGY & ADDITIONAL STUDIES: HOSPITAL COURSE:  74 y/o f hx emphysema, HLD, osteoporosis presents c/o cough, congestion, sore throat x 1 week and SOB for 2 days, found to be in COPD exacerbation 2/2 RSV infection with superimposed CAP. On 7lach, patient was given prednisone 40mg, duonebs standing q4, ctx 1g and azithromycin 500mg. Patient weaned down to 3L.    INTERVAL HPI/OVERNIGHT EVENTS: maddie    SUBJECTIVE: Patient seen and examined at bedside, resting comfortably in bed, and does not appear to be in any acute distress. Patient states that she has mild shortness of breath, denies any chest pain or abdominal pain.    MEDICATIONS  (STANDING):  albuterol/ipratropium for Nebulization 3 milliLiter(s) Nebulizer every 4 hours  azithromycin  IVPB      azithromycin  IVPB 500 milliGRAM(s) IV Intermittent every 24 hours  budesonide 160 MICROgram(s)/formoterol 4.5 MICROgram(s) Inhaler 2 Puff(s) Inhalation two times a day  cefTRIAXone   IVPB 1000 milliGRAM(s) IV Intermittent every 24 hours  enoxaparin Injectable 40 milliGRAM(s) SubCutaneous every 24 hours  predniSONE   Tablet 40 milliGRAM(s) Oral every 24 hours    MEDICATIONS  (PRN):  acetaminophen     Tablet .. 975 milliGRAM(s) Oral every 6 hours PRN Temp greater or equal to 38C (100.4F), Mild Pain (1 - 3)      Vital Signs Last 24 Hrs  T(C): 37.1 (04 Dec 2023 21:45), Max: 37.2 (04 Dec 2023 17:48)  T(F): 98.8 (04 Dec 2023 21:45), Max: 99 (04 Dec 2023 17:48)  HR: 103 (05 Dec 2023 05:45) (95 - 133)  BP: 101/55 (05 Dec 2023 05:45) (100/63 - 118/64)  BP(mean): 73 (05 Dec 2023 05:45) (73 - 82)  RR: 20 (05 Dec 2023 05:45) (18 - 23)  SpO2: 97% (05 Dec 2023 05:45) (88% - 99%)    Parameters below as of 05 Dec 2023 05:45  Patient On (Oxygen Delivery Method): nasal cannula  O2 Flow (L/min): 4      PHYSICAL EXAM:  General: in no acute distress  Eyes: EOMI intact bilaterally, pupils 6mm bilateral and briskly reactive to light  HENT: Moist mucous membranes  Neck: Trachea midline  Lungs: crackles of right lower base, no wheezes  Cardiovascular: tachycardic  Abdomen: Soft, non-tender non-distended  Extremities: mildly cool to touch, wwp  Neurological: Alert and oriented, isolated left facial nerve palsy  Skin: Warm and dry    LABS:                        13.5   14.79 )-----------( 291      ( 04 Dec 2023 13:29 )             41.2     12-04    136  |  98  |  14  ----------------------------<  123<H>  4.0   |  25  |  0.79    Ca    9.6      04 Dec 2023 13:29    TPro  7.9  /  Alb  3.5  /  TBili  0.4  /  DBili  x   /  AST  44<H>  /  ALT  25  /  AlkPhos  86  12-04    PT/INR - ( 04 Dec 2023 13:29 )   PT: 13.0 sec;   INR: 1.14          PTT - ( 04 Dec 2023 13:29 )  PTT:34.2 sec  Urinalysis Basic - ( 04 Dec 2023 15:33 )    Color: Yellow / Appearance: Clear / S.023 / pH: x  Gluc: x / Ketone: 15 mg/dL  / Bili: Negative / Urobili: 1.0 mg/dL   Blood: x / Protein: 100 mg/dL / Nitrite: Negative   Leuk Esterase: Small / RBC: 4 /HPF / WBC 9 /HPF   Sq Epi: x / Non Sq Epi: 15 /HPF / Bacteria: Negative /HPF        MICROBIOLOGY:    RADIOLOGY & ADDITIONAL STUDIES:

## 2023-12-05 NOTE — DIETITIAN INITIAL EVALUATION ADULT - NSFNSNUTRHOMESUPPLEMENTFT_GEN_A_CORE
declines
States that she takes whey protein at home. Endorses consuming Ensure 1 can every other day.

## 2023-12-05 NOTE — DIETITIAN INITIAL EVALUATION ADULT - NSFNSGIASSESSMENTFT_GEN_A_CORE
No issues with nausea, vomiting, diarrhea, constipation reported at time of visit. Pt reports not having a bowel movement since admission. Pt is not currently on a bowel regimen.

## 2023-12-05 NOTE — DIETITIAN INITIAL EVALUATION ADULT - EDUCATION DIETARY MODIFICATIONS
Educated/Discussed on ways to promote intake in setting of fair appetite. Educated/Discussed the importance to prioritize protein at meal times. Reviewed good sources of protein on the menu. Pt amenable to oral nutrition supplement and understand that it should be consumed in between meals and not as a meal replacement. Food Preferences noted. Pt receptive and agreeable to education./(2) meets goals/outcomes/verbalization

## 2023-12-05 NOTE — DIETITIAN INITIAL EVALUATION ADULT - NS FNS DIET ORDER
Diet, Regular:   Supplement Feeding Modality:  Oral  Ensure Max Cans or Servings Per Day:  1       Frequency:  Three Times a day (12-05-23 @ 09:52)

## 2023-12-06 DIAGNOSIS — A41.9 SEPSIS, UNSPECIFIED ORGANISM: ICD-10-CM

## 2023-12-06 LAB
ANION GAP SERPL CALC-SCNC: 13 MMOL/L — SIGNIFICANT CHANGE UP (ref 5–17)
ANION GAP SERPL CALC-SCNC: 13 MMOL/L — SIGNIFICANT CHANGE UP (ref 5–17)
ANISOCYTOSIS BLD QL: SLIGHT — SIGNIFICANT CHANGE UP
ANISOCYTOSIS BLD QL: SLIGHT — SIGNIFICANT CHANGE UP
BASOPHILS # BLD AUTO: 0 K/UL — SIGNIFICANT CHANGE UP (ref 0–0.2)
BASOPHILS # BLD AUTO: 0 K/UL — SIGNIFICANT CHANGE UP (ref 0–0.2)
BASOPHILS NFR BLD AUTO: 0 % — SIGNIFICANT CHANGE UP (ref 0–2)
BASOPHILS NFR BLD AUTO: 0 % — SIGNIFICANT CHANGE UP (ref 0–2)
BUN SERPL-MCNC: 26 MG/DL — HIGH (ref 7–23)
BUN SERPL-MCNC: 26 MG/DL — HIGH (ref 7–23)
BURR CELLS BLD QL SMEAR: PRESENT — SIGNIFICANT CHANGE UP
BURR CELLS BLD QL SMEAR: PRESENT — SIGNIFICANT CHANGE UP
CALCIUM SERPL-MCNC: 9.8 MG/DL — SIGNIFICANT CHANGE UP (ref 8.4–10.5)
CALCIUM SERPL-MCNC: 9.8 MG/DL — SIGNIFICANT CHANGE UP (ref 8.4–10.5)
CHLORIDE SERPL-SCNC: 106 MMOL/L — SIGNIFICANT CHANGE UP (ref 96–108)
CHLORIDE SERPL-SCNC: 106 MMOL/L — SIGNIFICANT CHANGE UP (ref 96–108)
CO2 SERPL-SCNC: 24 MMOL/L — SIGNIFICANT CHANGE UP (ref 22–31)
CO2 SERPL-SCNC: 24 MMOL/L — SIGNIFICANT CHANGE UP (ref 22–31)
CREAT SERPL-MCNC: 0.61 MG/DL — SIGNIFICANT CHANGE UP (ref 0.5–1.3)
CREAT SERPL-MCNC: 0.61 MG/DL — SIGNIFICANT CHANGE UP (ref 0.5–1.3)
CULTURE RESULTS: SIGNIFICANT CHANGE UP
CULTURE RESULTS: SIGNIFICANT CHANGE UP
EGFR: 95 ML/MIN/1.73M2 — SIGNIFICANT CHANGE UP
EGFR: 95 ML/MIN/1.73M2 — SIGNIFICANT CHANGE UP
EOSINOPHIL # BLD AUTO: 0 K/UL — SIGNIFICANT CHANGE UP (ref 0–0.5)
EOSINOPHIL # BLD AUTO: 0 K/UL — SIGNIFICANT CHANGE UP (ref 0–0.5)
EOSINOPHIL NFR BLD AUTO: 0 % — SIGNIFICANT CHANGE UP (ref 0–6)
EOSINOPHIL NFR BLD AUTO: 0 % — SIGNIFICANT CHANGE UP (ref 0–6)
GLUCOSE BLDC GLUCOMTR-MCNC: 138 MG/DL — HIGH (ref 70–99)
GLUCOSE BLDC GLUCOMTR-MCNC: 138 MG/DL — HIGH (ref 70–99)
GLUCOSE SERPL-MCNC: 139 MG/DL — HIGH (ref 70–99)
GLUCOSE SERPL-MCNC: 139 MG/DL — HIGH (ref 70–99)
HCT VFR BLD CALC: 33.2 % — LOW (ref 34.5–45)
HCT VFR BLD CALC: 33.2 % — LOW (ref 34.5–45)
HGB BLD-MCNC: 11.3 G/DL — LOW (ref 11.5–15.5)
HGB BLD-MCNC: 11.3 G/DL — LOW (ref 11.5–15.5)
LYMPHOCYTES # BLD AUTO: 0.96 K/UL — LOW (ref 1–3.3)
LYMPHOCYTES # BLD AUTO: 0.96 K/UL — LOW (ref 1–3.3)
LYMPHOCYTES # BLD AUTO: 3.5 % — LOW (ref 13–44)
LYMPHOCYTES # BLD AUTO: 3.5 % — LOW (ref 13–44)
MAGNESIUM SERPL-MCNC: 1.9 MG/DL — SIGNIFICANT CHANGE UP (ref 1.6–2.6)
MAGNESIUM SERPL-MCNC: 1.9 MG/DL — SIGNIFICANT CHANGE UP (ref 1.6–2.6)
MANUAL SMEAR VERIFICATION: SIGNIFICANT CHANGE UP
MANUAL SMEAR VERIFICATION: SIGNIFICANT CHANGE UP
MCHC RBC-ENTMCNC: 29.3 PG — SIGNIFICANT CHANGE UP (ref 27–34)
MCHC RBC-ENTMCNC: 29.3 PG — SIGNIFICANT CHANGE UP (ref 27–34)
MCHC RBC-ENTMCNC: 34 GM/DL — SIGNIFICANT CHANGE UP (ref 32–36)
MCHC RBC-ENTMCNC: 34 GM/DL — SIGNIFICANT CHANGE UP (ref 32–36)
MCV RBC AUTO: 86 FL — SIGNIFICANT CHANGE UP (ref 80–100)
MCV RBC AUTO: 86 FL — SIGNIFICANT CHANGE UP (ref 80–100)
MONOCYTES # BLD AUTO: 1.18 K/UL — HIGH (ref 0–0.9)
MONOCYTES # BLD AUTO: 1.18 K/UL — HIGH (ref 0–0.9)
MONOCYTES NFR BLD AUTO: 4.3 % — SIGNIFICANT CHANGE UP (ref 2–14)
MONOCYTES NFR BLD AUTO: 4.3 % — SIGNIFICANT CHANGE UP (ref 2–14)
NEUTROPHILS # BLD AUTO: 25.14 K/UL — HIGH (ref 1.8–7.4)
NEUTROPHILS # BLD AUTO: 25.14 K/UL — HIGH (ref 1.8–7.4)
NEUTROPHILS NFR BLD AUTO: 91.3 % — HIGH (ref 43–77)
NEUTROPHILS NFR BLD AUTO: 91.3 % — HIGH (ref 43–77)
OVALOCYTES BLD QL SMEAR: SLIGHT — SIGNIFICANT CHANGE UP
OVALOCYTES BLD QL SMEAR: SLIGHT — SIGNIFICANT CHANGE UP
PHOSPHATE SERPL-MCNC: 3 MG/DL — SIGNIFICANT CHANGE UP (ref 2.5–4.5)
PHOSPHATE SERPL-MCNC: 3 MG/DL — SIGNIFICANT CHANGE UP (ref 2.5–4.5)
PLAT MORPH BLD: NORMAL — SIGNIFICANT CHANGE UP
PLAT MORPH BLD: NORMAL — SIGNIFICANT CHANGE UP
PLATELET # BLD AUTO: 328 K/UL — SIGNIFICANT CHANGE UP (ref 150–400)
PLATELET # BLD AUTO: 328 K/UL — SIGNIFICANT CHANGE UP (ref 150–400)
POIKILOCYTOSIS BLD QL AUTO: SLIGHT — SIGNIFICANT CHANGE UP
POIKILOCYTOSIS BLD QL AUTO: SLIGHT — SIGNIFICANT CHANGE UP
POTASSIUM SERPL-MCNC: 3.9 MMOL/L — SIGNIFICANT CHANGE UP (ref 3.5–5.3)
POTASSIUM SERPL-MCNC: 3.9 MMOL/L — SIGNIFICANT CHANGE UP (ref 3.5–5.3)
POTASSIUM SERPL-SCNC: 3.9 MMOL/L — SIGNIFICANT CHANGE UP (ref 3.5–5.3)
POTASSIUM SERPL-SCNC: 3.9 MMOL/L — SIGNIFICANT CHANGE UP (ref 3.5–5.3)
RBC # BLD: 3.86 M/UL — SIGNIFICANT CHANGE UP (ref 3.8–5.2)
RBC # BLD: 3.86 M/UL — SIGNIFICANT CHANGE UP (ref 3.8–5.2)
RBC # FLD: 13.8 % — SIGNIFICANT CHANGE UP (ref 10.3–14.5)
RBC # FLD: 13.8 % — SIGNIFICANT CHANGE UP (ref 10.3–14.5)
RBC BLD AUTO: ABNORMAL
RBC BLD AUTO: ABNORMAL
SODIUM SERPL-SCNC: 143 MMOL/L — SIGNIFICANT CHANGE UP (ref 135–145)
SODIUM SERPL-SCNC: 143 MMOL/L — SIGNIFICANT CHANGE UP (ref 135–145)
SPECIMEN SOURCE: SIGNIFICANT CHANGE UP
SPECIMEN SOURCE: SIGNIFICANT CHANGE UP
TSH SERPL-MCNC: 0.36 UIU/ML — SIGNIFICANT CHANGE UP (ref 0.27–4.2)
TSH SERPL-MCNC: 0.36 UIU/ML — SIGNIFICANT CHANGE UP (ref 0.27–4.2)
VARIANT LYMPHS # BLD: 0.9 % — SIGNIFICANT CHANGE UP (ref 0–6)
VARIANT LYMPHS # BLD: 0.9 % — SIGNIFICANT CHANGE UP (ref 0–6)
WBC # BLD: 27.54 K/UL — HIGH (ref 3.8–10.5)
WBC # BLD: 27.54 K/UL — HIGH (ref 3.8–10.5)
WBC # FLD AUTO: 27.54 K/UL — HIGH (ref 3.8–10.5)
WBC # FLD AUTO: 27.54 K/UL — HIGH (ref 3.8–10.5)

## 2023-12-06 PROCEDURE — 99232 SBSQ HOSP IP/OBS MODERATE 35: CPT | Mod: GC

## 2023-12-06 RX ORDER — SODIUM CHLORIDE 9 MG/ML
1000 INJECTION, SOLUTION INTRAVENOUS
Refills: 0 | Status: DISCONTINUED | OUTPATIENT
Start: 2023-12-06 | End: 2023-12-10

## 2023-12-06 RX ORDER — INSULIN LISPRO 100/ML
VIAL (ML) SUBCUTANEOUS
Refills: 0 | Status: DISCONTINUED | OUTPATIENT
Start: 2023-12-06 | End: 2023-12-10

## 2023-12-06 RX ORDER — GLUCAGON INJECTION, SOLUTION 0.5 MG/.1ML
1 INJECTION, SOLUTION SUBCUTANEOUS ONCE
Refills: 0 | Status: DISCONTINUED | OUTPATIENT
Start: 2023-12-06 | End: 2023-12-10

## 2023-12-06 RX ORDER — CEFTRIAXONE 500 MG/1
2000 INJECTION, POWDER, FOR SOLUTION INTRAMUSCULAR; INTRAVENOUS EVERY 24 HOURS
Refills: 0 | Status: DISCONTINUED | OUTPATIENT
Start: 2023-12-06 | End: 2023-12-09

## 2023-12-06 RX ORDER — DEXTROSE 50 % IN WATER 50 %
25 SYRINGE (ML) INTRAVENOUS ONCE
Refills: 0 | Status: DISCONTINUED | OUTPATIENT
Start: 2023-12-06 | End: 2023-12-10

## 2023-12-06 RX ORDER — SODIUM CHLORIDE 0.65 %
1 AEROSOL, SPRAY (ML) NASAL THREE TIMES A DAY
Refills: 0 | Status: DISCONTINUED | OUTPATIENT
Start: 2023-12-06 | End: 2023-12-10

## 2023-12-06 RX ORDER — DEXTROSE 50 % IN WATER 50 %
12.5 SYRINGE (ML) INTRAVENOUS ONCE
Refills: 0 | Status: DISCONTINUED | OUTPATIENT
Start: 2023-12-06 | End: 2023-12-10

## 2023-12-06 RX ORDER — DEXTROSE 50 % IN WATER 50 %
15 SYRINGE (ML) INTRAVENOUS ONCE
Refills: 0 | Status: DISCONTINUED | OUTPATIENT
Start: 2023-12-06 | End: 2023-12-10

## 2023-12-06 RX ADMIN — Medication 3 MILLILITER(S): at 14:41

## 2023-12-06 RX ADMIN — PANTOPRAZOLE SODIUM 40 MILLIGRAM(S): 20 TABLET, DELAYED RELEASE ORAL at 07:18

## 2023-12-06 RX ADMIN — Medication 3 MILLILITER(S): at 07:19

## 2023-12-06 RX ADMIN — Medication 3 MILLILITER(S): at 10:43

## 2023-12-06 RX ADMIN — Medication 3 MILLILITER(S): at 18:24

## 2023-12-06 RX ADMIN — AZITHROMYCIN 255 MILLIGRAM(S): 500 TABLET, FILM COATED ORAL at 19:29

## 2023-12-06 RX ADMIN — Medication 40 MILLIGRAM(S): at 07:19

## 2023-12-06 RX ADMIN — Medication 1 SPRAY(S): at 19:27

## 2023-12-06 RX ADMIN — CEFTRIAXONE 100 MILLIGRAM(S): 500 INJECTION, POWDER, FOR SOLUTION INTRAMUSCULAR; INTRAVENOUS at 15:54

## 2023-12-06 RX ADMIN — Medication 3 MILLILITER(S): at 03:01

## 2023-12-06 RX ADMIN — ENOXAPARIN SODIUM 40 MILLIGRAM(S): 100 INJECTION SUBCUTANEOUS at 19:29

## 2023-12-06 NOTE — PROGRESS NOTE ADULT - PROBLEM SELECTOR PLAN 4
Pt with known severe emphysema, asymptomatic at baseline not on maintenance inhalers. Follows with Dr. Zapien. Baseline O2 >95%. RSV positive in ED, likely trigger for COPD exacerbation. Patient desaturated to SpO2 88%in the ED.    - c/w ceftriaxone 1g q24hrs, azithromycin 500mg q24hrs  - See COPD management above. Pt with known severe emphysema, asymptomatic at baseline not on maintenance inhalers. Follows with Dr. Zapien. Baseline O2 >95%. RSV positive in ED, likely trigger for COPD exacerbation. Patient desaturated to SpO2 88%in the ED.    - ceftriaxone 1g q24hrs increased to ceftriaxone 2g q24hrs  - c/w azithromycin 500mg q24hrs  - See COPD management above.

## 2023-12-06 NOTE — PROGRESS NOTE ADULT - PROBLEM SELECTOR PLAN 2
Pt with known severe emphysema, asymptomatic at baseline not on maintenance inhalers. Follows with Dr. Zapien. Baseline O2 >95%. RSV positive in ED, likely trigger for COPD exacerbation. Not on home o2  Legionella mrsa and strep negative  Pro-sid 1.17    - C/w prednisone 40mg daily  - C/w duonebs standing q4  - c/w ceftriaxone 1g q24hrs, azithromycin 500mg q24hrs  - C/w NC 3L for supplemental O2, goal SpO2 88-92%. Pt with known severe emphysema, asymptomatic at baseline not on maintenance inhalers. Follows with Dr. Zapien. Baseline O2 >95%. RSV positive in ED, likely trigger for COPD exacerbation. Not on home o2  Legionella mrsa and strep negative  Pro-sid 1.17    - C/w prednisone 40mg daily  - C/w duonebs standing q4  - ceftriaxone 1g q24hrs increased to ceftriaxone 2g q24hrs  - c/w azithromycin 500mg q24hrs  - 3L NC weaned to 2L nc for supplemental O2, goal SpO2 88-92%.

## 2023-12-06 NOTE — PROGRESS NOTE ADULT - SUBJECTIVE AND OBJECTIVE BOX
*****INCOMPLETE NOTE*****    INTERVAL HPI/OVERNIGHT EVENTS:    SUBJECTIVE: Patient seen and examined at bedside, resting comfortably in bed, and does not appear to be in any acute distress. Patient states  Patient denies any recent fevers, chills, nausea, vomiting, headache, acute sob, chest pain, abdominal pain, genitourinary sx, extremity pain or swelling.     ANTIBIOTICS/RELEVANT:    MEDICATIONS  (STANDING):  albuterol/ipratropium for Nebulization 3 milliLiter(s) Nebulizer every 4 hours  azithromycin  IVPB      azithromycin  IVPB 500 milliGRAM(s) IV Intermittent every 24 hours  budesonide 160 MICROgram(s)/formoterol 4.5 MICROgram(s) Inhaler 2 Puff(s) Inhalation two times a day  cefTRIAXone   IVPB 1000 milliGRAM(s) IV Intermittent every 24 hours  enoxaparin Injectable 40 milliGRAM(s) SubCutaneous every 24 hours  pantoprazole    Tablet 40 milliGRAM(s) Oral before breakfast  predniSONE   Tablet 40 milliGRAM(s) Oral every 24 hours    MEDICATIONS  (PRN):  acetaminophen     Tablet .. 975 milliGRAM(s) Oral every 6 hours PRN Temp greater or equal to 38C (100.4F), Mild Pain (1 - 3)      Vital Signs Last 24 Hrs  T(C): 36.9 (06 Dec 2023 05:30), Max: 36.9 (06 Dec 2023 05:30)  T(F): 98.5 (06 Dec 2023 05:30), Max: 98.5 (06 Dec 2023 05:30)  HR: 110 (06 Dec 2023 04:15) (104 - 117)  BP: 113/61 (06 Dec 2023 04:15) (103/53 - 113/61)  BP(mean): 80 (06 Dec 2023 04:15) (73 - 80)  RR: 17 (06 Dec 2023 04:15) (17 - 28)  SpO2: 93% (06 Dec 2023 04:15) (91% - 98%)    Parameters below as of 06 Dec 2023 04:15  Patient On (Oxygen Delivery Method): nasal cannula  O2 Flow (L/min): 2      PHYSICAL EXAM:  General: in no acute distress  Eyes: EOMI intact bilaterally. Anicteric sclerae, moist conjunctivae  HENT: Moist mucous membranes  Neck: Trachea midline, supple  Lungs: CTA B/L. No wheezes, rales, or rhonchi  Cardiovascular: RRR. No murmurs, rubs, or gallops  Abdomen: Soft, non-tender non-distended; No rebound or guarding  Extremities: WWP, No clubbing, cyanosis or edema  Neurological: Alert and oriented  Skin: Warm and dry. No obvious rash     LABS:                        11.4   18.08 )-----------( 278      ( 05 Dec 2023 05:30 )             34.4     12-05    137  |  102  |  10  ----------------------------<  243<H>  3.7   |  24  |  0.62    Ca    9.1      05 Dec 2023 05:30  Phos  2.0     12-05  Mg     2.0     12-05    TPro  7.1  /  Alb  3.0<L>  /  TBili  0.2  /  DBili  x   /  AST  33  /  ALT  24  /  AlkPhos  80  12-05    PT/INR - ( 04 Dec 2023 13:29 )   PT: 13.0 sec;   INR: 1.14          PTT - ( 04 Dec 2023 13:29 )  PTT:34.2 sec  Urinalysis Basic - ( 05 Dec 2023 05:30 )    Color: x / Appearance: x / SG: x / pH: x  Gluc: 243 mg/dL / Ketone: x  / Bili: x / Urobili: x   Blood: x / Protein: x / Nitrite: x   Leuk Esterase: x / RBC: x / WBC x   Sq Epi: x / Non Sq Epi: x / Bacteria: x        MICROBIOLOGY:    RADIOLOGY & ADDITIONAL STUDIES: HOSPITAL COURSE:  72 y/o f hx emphysema, HLD, osteoporosis presents c/o cough, congestion, sore throat x 1 week and SOB for 2 days, found to be in COPD exacerbation 2/2 RSV infection with superimposed CAP. On 7lach, patient was given prednisone 40mg, duonebs standing q4, ctx 1g and azithromycin 500mg. Patient weaned down to 2L    INTERVAL HPI/OVERNIGHT EVENTS: maddie    SUBJECTIVE: Patient seen and examined at bedside, resting comfortably in bed, and does not appear to be in any acute distress. Patient states that her shortness of breath has improved. States that she had a bowel movement yesterday. Denies any pleuritic pain, chest pain or abdominal pain    MEDICATIONS  (STANDING):  albuterol/ipratropium for Nebulization 3 milliLiter(s) Nebulizer every 4 hours  azithromycin  IVPB      azithromycin  IVPB 500 milliGRAM(s) IV Intermittent every 24 hours  budesonide 160 MICROgram(s)/formoterol 4.5 MICROgram(s) Inhaler 2 Puff(s) Inhalation two times a day  cefTRIAXone   IVPB 1000 milliGRAM(s) IV Intermittent every 24 hours  enoxaparin Injectable 40 milliGRAM(s) SubCutaneous every 24 hours  pantoprazole    Tablet 40 milliGRAM(s) Oral before breakfast  predniSONE   Tablet 40 milliGRAM(s) Oral every 24 hours    MEDICATIONS  (PRN):  acetaminophen     Tablet .. 975 milliGRAM(s) Oral every 6 hours PRN Temp greater or equal to 38C (100.4F), Mild Pain (1 - 3)      Vital Signs Last 24 Hrs  T(C): 36.9 (06 Dec 2023 05:30), Max: 36.9 (06 Dec 2023 05:30)  T(F): 98.5 (06 Dec 2023 05:30), Max: 98.5 (06 Dec 2023 05:30)  HR: 110 (06 Dec 2023 04:15) (104 - 117)  BP: 113/61 (06 Dec 2023 04:15) (103/53 - 113/61)  BP(mean): 80 (06 Dec 2023 04:15) (73 - 80)  RR: 17 (06 Dec 2023 04:15) (17 - 28)  SpO2: 93% (06 Dec 2023 04:15) (91% - 98%)    Parameters below as of 06 Dec 2023 04:15  Patient On (Oxygen Delivery Method): nasal cannula  O2 Flow (L/min): 2      PHYSICAL EXAM:  General: in no acute distress  Eyes: EOMI intact bilaterally, pupils 6mm bilateral and briskly reactive to light  HENT: Moist mucous membranes  Neck: Trachea midline  Lungs: crackles of right lower base, no wheezes  Cardiovascular: RRR  Abdomen: Soft, non-tender non-distended  Extremities: WWP  Neurological: Alert and oriented  Skin: Warm and dry    LABS:                        11.4   18.08 )-----------( 278      ( 05 Dec 2023 05:30 )             34.4     12-05    137  |  102  |  10  ----------------------------<  243<H>  3.7   |  24  |  0.62    Ca    9.1      05 Dec 2023 05:30  Phos  2.0     12-05  Mg     2.0     12-05    TPro  7.1  /  Alb  3.0<L>  /  TBili  0.2  /  DBili  x   /  AST  33  /  ALT  24  /  AlkPhos  80  12-05    PT/INR - ( 04 Dec 2023 13:29 )   PT: 13.0 sec;   INR: 1.14          PTT - ( 04 Dec 2023 13:29 )  PTT:34.2 sec  Urinalysis Basic - ( 05 Dec 2023 05:30 )    Color: x / Appearance: x / SG: x / pH: x  Gluc: 243 mg/dL / Ketone: x  / Bili: x / Urobili: x   Blood: x / Protein: x / Nitrite: x   Leuk Esterase: x / RBC: x / WBC x   Sq Epi: x / Non Sq Epi: x / Bacteria: x        MICROBIOLOGY:    RADIOLOGY & ADDITIONAL STUDIES: HOSPITAL COURSE:  72 y/o f hx emphysema, HLD, osteoporosis presents c/o cough, congestion, sore throat x 1 week and SOB for 2 days, found to be in COPD exacerbation 2/2 RSV infection with superimposed CAP. On 7lach, patient was given prednisone 40mg, duonebs standing q4, ctx 1g and azithromycin 500mg. Patient weaned down to 2L    INTERVAL HPI/OVERNIGHT EVENTS: maddie    SUBJECTIVE: Patient seen and examined at bedside, resting comfortably in bed, and does not appear to be in any acute distress. Patient states that her shortness of breath has improved. States that she had a bowel movement yesterday. Denies any pleuritic pain, chest pain or abdominal pain    MEDICATIONS  (STANDING):  albuterol/ipratropium for Nebulization 3 milliLiter(s) Nebulizer every 4 hours  azithromycin  IVPB      azithromycin  IVPB 500 milliGRAM(s) IV Intermittent every 24 hours  budesonide 160 MICROgram(s)/formoterol 4.5 MICROgram(s) Inhaler 2 Puff(s) Inhalation two times a day  cefTRIAXone   IVPB 1000 milliGRAM(s) IV Intermittent every 24 hours  enoxaparin Injectable 40 milliGRAM(s) SubCutaneous every 24 hours  pantoprazole    Tablet 40 milliGRAM(s) Oral before breakfast  predniSONE   Tablet 40 milliGRAM(s) Oral every 24 hours    MEDICATIONS  (PRN):  acetaminophen     Tablet .. 975 milliGRAM(s) Oral every 6 hours PRN Temp greater or equal to 38C (100.4F), Mild Pain (1 - 3)      Vital Signs Last 24 Hrs  T(C): 36.9 (06 Dec 2023 05:30), Max: 36.9 (06 Dec 2023 05:30)  T(F): 98.5 (06 Dec 2023 05:30), Max: 98.5 (06 Dec 2023 05:30)  HR: 110 (06 Dec 2023 04:15) (104 - 117)  BP: 113/61 (06 Dec 2023 04:15) (103/53 - 113/61)  BP(mean): 80 (06 Dec 2023 04:15) (73 - 80)  RR: 17 (06 Dec 2023 04:15) (17 - 28)  SpO2: 93% (06 Dec 2023 04:15) (91% - 98%)    Parameters below as of 06 Dec 2023 04:15  Patient On (Oxygen Delivery Method): nasal cannula  O2 Flow (L/min): 2      PHYSICAL EXAM:  General: in no acute distress  Eyes: EOMI intact bilaterally, pupils 6mm bilateral and briskly reactive to light  HENT: Moist mucous membranes, no thrush noted  Neck: Trachea midline  Lungs: crackles of right lower base, no wheezes  Cardiovascular: RRR  Abdomen: Soft, non-tender non-distended  Extremities: WWP  Neurological: Alert and oriented, isolated left facial nerve palsy  Skin: Warm and dry    LABS:                        11.4   18.08 )-----------( 278      ( 05 Dec 2023 05:30 )             34.4     12-05    137  |  102  |  10  ----------------------------<  243<H>  3.7   |  24  |  0.62    Ca    9.1      05 Dec 2023 05:30  Phos  2.0     12-05  Mg     2.0     12-05    TPro  7.1  /  Alb  3.0<L>  /  TBili  0.2  /  DBili  x   /  AST  33  /  ALT  24  /  AlkPhos  80  12-05    PT/INR - ( 04 Dec 2023 13:29 )   PT: 13.0 sec;   INR: 1.14          PTT - ( 04 Dec 2023 13:29 )  PTT:34.2 sec  Urinalysis Basic - ( 05 Dec 2023 05:30 )    Color: x / Appearance: x / SG: x / pH: x  Gluc: 243 mg/dL / Ketone: x  / Bili: x / Urobili: x   Blood: x / Protein: x / Nitrite: x   Leuk Esterase: x / RBC: x / WBC x   Sq Epi: x / Non Sq Epi: x / Bacteria: x        MICROBIOLOGY:    RADIOLOGY & ADDITIONAL STUDIES: HOSPITAL COURSE:  74 y/o f hx emphysema, HLD, osteoporosis presents c/o cough, congestion, sore throat x 1 week and SOB for 2 days, found to be in COPD exacerbation 2/2 RSV infection with superimposed CAP. On 7lach, patient was given prednisone 40mg, duonebs standing q4, ctx 1g and azithromycin 500mg. Patient weaned down to 2L    INTERVAL HPI/OVERNIGHT EVENTS: maddie    SUBJECTIVE: Patient seen and examined at bedside, resting comfortably in bed, and does not appear to be in any acute distress. Patient states that her shortness of breath has improved. States that she had a bowel movement yesterday. Denies any pleuritic pain, chest pain or abdominal pain    MEDICATIONS  (STANDING):  albuterol/ipratropium for Nebulization 3 milliLiter(s) Nebulizer every 4 hours  azithromycin  IVPB      azithromycin  IVPB 500 milliGRAM(s) IV Intermittent every 24 hours  budesonide 160 MICROgram(s)/formoterol 4.5 MICROgram(s) Inhaler 2 Puff(s) Inhalation two times a day  cefTRIAXone   IVPB 1000 milliGRAM(s) IV Intermittent every 24 hours  enoxaparin Injectable 40 milliGRAM(s) SubCutaneous every 24 hours  pantoprazole    Tablet 40 milliGRAM(s) Oral before breakfast  predniSONE   Tablet 40 milliGRAM(s) Oral every 24 hours    MEDICATIONS  (PRN):  acetaminophen     Tablet .. 975 milliGRAM(s) Oral every 6 hours PRN Temp greater or equal to 38C (100.4F), Mild Pain (1 - 3)      Vital Signs Last 24 Hrs  T(C): 36.9 (06 Dec 2023 05:30), Max: 36.9 (06 Dec 2023 05:30)  T(F): 98.5 (06 Dec 2023 05:30), Max: 98.5 (06 Dec 2023 05:30)  HR: 110 (06 Dec 2023 04:15) (104 - 117)  BP: 113/61 (06 Dec 2023 04:15) (103/53 - 113/61)  BP(mean): 80 (06 Dec 2023 04:15) (73 - 80)  RR: 17 (06 Dec 2023 04:15) (17 - 28)  SpO2: 93% (06 Dec 2023 04:15) (91% - 98%)    Parameters below as of 06 Dec 2023 04:15  Patient On (Oxygen Delivery Method): nasal cannula  O2 Flow (L/min): 2      PHYSICAL EXAM:  General: in no acute distress  Eyes: EOMI intact bilaterally, pupils 6mm bilateral and briskly reactive to light  HENT: Moist mucous membranes, no thrush noted  Neck: Trachea midline  Lungs: crackles of right lower base, no wheezes  Cardiovascular: RRR  Abdomen: Soft, non-tender non-distended  Extremities: WWP  Neurological: Alert and oriented, isolated left facial nerve palsy  Skin: Warm and dry    LABS:                        11.4   18.08 )-----------( 278      ( 05 Dec 2023 05:30 )             34.4     12-05    137  |  102  |  10  ----------------------------<  243<H>  3.7   |  24  |  0.62    Ca    9.1      05 Dec 2023 05:30  Phos  2.0     12-05  Mg     2.0     12-05    TPro  7.1  /  Alb  3.0<L>  /  TBili  0.2  /  DBili  x   /  AST  33  /  ALT  24  /  AlkPhos  80  12-05    PT/INR - ( 04 Dec 2023 13:29 )   PT: 13.0 sec;   INR: 1.14          PTT - ( 04 Dec 2023 13:29 )  PTT:34.2 sec  Urinalysis Basic - ( 05 Dec 2023 05:30 )    Color: x / Appearance: x / SG: x / pH: x  Gluc: 243 mg/dL / Ketone: x  / Bili: x / Urobili: x   Blood: x / Protein: x / Nitrite: x   Leuk Esterase: x / RBC: x / WBC x   Sq Epi: x / Non Sq Epi: x / Bacteria: x        MICROBIOLOGY:    RADIOLOGY & ADDITIONAL STUDIES: HOSPITAL COURSE:  74 y/o f hx emphysema, HLD, osteoporosis presents c/o cough, congestion, sore throat x 1 week and SOB for 2 days, found to be in COPD exacerbation 2/2 RSV infection with superimposed CAP. On 7lach, patient was given prednisone 40mg, duonebs standing q4, ctx 1g and azithromycin 500mg. Patient weaned down to 2L    INTERVAL HPI/OVERNIGHT EVENTS: maddie    SUBJECTIVE: Patient seen and examined at bedside, resting comfortably in bed, and does not appear to be in any acute distress. Patient states that her shortness of breath has improved. States that she had a bowel movement yesterday. Denies any pleuritic pain, chest pain or abdominal pain    MEDICATIONS  (STANDING):  albuterol/ipratropium for Nebulization 3 milliLiter(s) Nebulizer every 4 hours  azithromycin  IVPB      azithromycin  IVPB 500 milliGRAM(s) IV Intermittent every 24 hours  budesonide 160 MICROgram(s)/formoterol 4.5 MICROgram(s) Inhaler 2 Puff(s) Inhalation two times a day  cefTRIAXone   IVPB 1000 milliGRAM(s) IV Intermittent every 24 hours  enoxaparin Injectable 40 milliGRAM(s) SubCutaneous every 24 hours  pantoprazole    Tablet 40 milliGRAM(s) Oral before breakfast  predniSONE   Tablet 40 milliGRAM(s) Oral every 24 hours    MEDICATIONS  (PRN):  acetaminophen     Tablet .. 975 milliGRAM(s) Oral every 6 hours PRN Temp greater or equal to 38C (100.4F), Mild Pain (1 - 3)      Vital Signs Last 24 Hrs  T(C): 36.9 (06 Dec 2023 05:30), Max: 36.9 (06 Dec 2023 05:30)  T(F): 98.5 (06 Dec 2023 05:30), Max: 98.5 (06 Dec 2023 05:30)  HR: 110 (06 Dec 2023 04:15) (104 - 117)  BP: 113/61 (06 Dec 2023 04:15) (103/53 - 113/61)  BP(mean): 80 (06 Dec 2023 04:15) (73 - 80)  RR: 17 (06 Dec 2023 04:15) (17 - 28)  SpO2: 93% (06 Dec 2023 04:15) (91% - 98%)    Parameters below as of 06 Dec 2023 04:15  Patient On (Oxygen Delivery Method): nasal cannula  O2 Flow (L/min): 2      PHYSICAL EXAM:  General: in no acute distress  Eyes: EOMI intact bilaterally, pupils 6mm bilateral and briskly reactive to light  HENT: Moist mucous membranes, no thrush noted  Neck: Trachea midline  Lungs: crackles of right lower base, no wheezes  Cardiovascular: RRR  Abdomen: Soft, non-tender non-distended  Extremities: WWP, mildly cyanotic nails  Neurological: Alert and oriented, isolated left facial nerve palsy  Skin: Warm and dry    LABS:                        11.4   18.08 )-----------( 278      ( 05 Dec 2023 05:30 )             34.4     12-05    137  |  102  |  10  ----------------------------<  243<H>  3.7   |  24  |  0.62    Ca    9.1      05 Dec 2023 05:30  Phos  2.0     12-05  Mg     2.0     12-05    TPro  7.1  /  Alb  3.0<L>  /  TBili  0.2  /  DBili  x   /  AST  33  /  ALT  24  /  AlkPhos  80  12-05    PT/INR - ( 04 Dec 2023 13:29 )   PT: 13.0 sec;   INR: 1.14          PTT - ( 04 Dec 2023 13:29 )  PTT:34.2 sec  Urinalysis Basic - ( 05 Dec 2023 05:30 )    Color: x / Appearance: x / SG: x / pH: x  Gluc: 243 mg/dL / Ketone: x  / Bili: x / Urobili: x   Blood: x / Protein: x / Nitrite: x   Leuk Esterase: x / RBC: x / WBC x   Sq Epi: x / Non Sq Epi: x / Bacteria: x        MICROBIOLOGY:    RADIOLOGY & ADDITIONAL STUDIES:

## 2023-12-06 NOTE — PROGRESS NOTE ADULT - PROBLEM SELECTOR PLAN 3
RSV positive in ED, likely trigger for COPD exacerbation. With superimposed bacterial infection  Pro- sid 1.17    - c/w ceftriaxone 1g q24hrs, azithromycin 500mg q24hrs  - See COPD management above. RSV positive in ED, likely trigger for COPD exacerbation. With superimposed bacterial infection  Pro- sid 1.17    - ceftriaxone 1g q24hrs increased to ceftriaxone 2g q24hrs  - c/w azithromycin 500mg q24hrs

## 2023-12-06 NOTE — PROGRESS NOTE ADULT - ASSESSMENT
72 y/o f hx emphysema, HLD, osteoporosis presents c/o cough, congestion, sore throat x 1 week and SOB for 2 days, found to be in COPD exacerbation 2/2 RSV infection with superimposed CAP. 74 y/o f hx emphysema, HLD, osteoporosis presents c/o cough, congestion, sore throat x 1 week and SOB for 2 days, found to be in COPD exacerbation 2/2 RSV infection with superimposed CAP.  Patient with sepsis secondary to RSV pneumonia and possibly CAP, with acute hypoxemic respiratory failure secondary to RSV pneumonia and possibly CAP with COPD exacerbation all present on admission

## 2023-12-06 NOTE — PROGRESS NOTE ADULT - PROBLEM SELECTOR PLAN 1
Tachy to 124, leukocytosis to 14.79, with pneumonia source. Received 1L ns bolus in ED.  CXR: Suspected emphysema. Subtle opacities left basal. Findings can be correlated with CT chest.  Procal 1.17    - c/w ceftriaxone 1g q24hrs, azithromycin 500mg q24hrs Tachy to 124, leukocytosis to 14.79, with pneumonia source. Received 1L ns bolus in ED.  CXR: Suspected emphysema. Subtle opacities left basal. Findings can be correlated with CT chest.  Procal 1.17  Leukocytosis elevated to 27.54 from 18.08    - ceftriaxone 1g q24hrs increased to ceftriaxone 2g q24hrs  - c/w azithromycin 500mg q24hrs

## 2023-12-07 LAB
A1C WITH ESTIMATED AVERAGE GLUCOSE RESULT: 6.2 % — HIGH (ref 4–5.6)
A1C WITH ESTIMATED AVERAGE GLUCOSE RESULT: 6.2 % — HIGH (ref 4–5.6)
AGGLUTINATION: PRESENT — SIGNIFICANT CHANGE UP
AGGLUTINATION: PRESENT — SIGNIFICANT CHANGE UP
ANION GAP SERPL CALC-SCNC: 11 MMOL/L — SIGNIFICANT CHANGE UP (ref 5–17)
ANION GAP SERPL CALC-SCNC: 11 MMOL/L — SIGNIFICANT CHANGE UP (ref 5–17)
ANISOCYTOSIS BLD QL: SLIGHT — SIGNIFICANT CHANGE UP
ANISOCYTOSIS BLD QL: SLIGHT — SIGNIFICANT CHANGE UP
BASOPHILS # BLD AUTO: 0 K/UL — SIGNIFICANT CHANGE UP (ref 0–0.2)
BASOPHILS # BLD AUTO: 0 K/UL — SIGNIFICANT CHANGE UP (ref 0–0.2)
BASOPHILS NFR BLD AUTO: 0 % — SIGNIFICANT CHANGE UP (ref 0–2)
BASOPHILS NFR BLD AUTO: 0 % — SIGNIFICANT CHANGE UP (ref 0–2)
BLD GP AB SCN SERPL QL: NEGATIVE — SIGNIFICANT CHANGE UP
BLD GP AB SCN SERPL QL: NEGATIVE — SIGNIFICANT CHANGE UP
BUN SERPL-MCNC: 29 MG/DL — HIGH (ref 7–23)
BUN SERPL-MCNC: 29 MG/DL — HIGH (ref 7–23)
CALCIUM SERPL-MCNC: 9.5 MG/DL — SIGNIFICANT CHANGE UP (ref 8.4–10.5)
CALCIUM SERPL-MCNC: 9.5 MG/DL — SIGNIFICANT CHANGE UP (ref 8.4–10.5)
CHLORIDE SERPL-SCNC: 102 MMOL/L — SIGNIFICANT CHANGE UP (ref 96–108)
CHLORIDE SERPL-SCNC: 102 MMOL/L — SIGNIFICANT CHANGE UP (ref 96–108)
CO2 SERPL-SCNC: 30 MMOL/L — SIGNIFICANT CHANGE UP (ref 22–31)
CO2 SERPL-SCNC: 30 MMOL/L — SIGNIFICANT CHANGE UP (ref 22–31)
CREAT SERPL-MCNC: 0.71 MG/DL — SIGNIFICANT CHANGE UP (ref 0.5–1.3)
CREAT SERPL-MCNC: 0.71 MG/DL — SIGNIFICANT CHANGE UP (ref 0.5–1.3)
DACRYOCYTES BLD QL SMEAR: SLIGHT — SIGNIFICANT CHANGE UP
DACRYOCYTES BLD QL SMEAR: SLIGHT — SIGNIFICANT CHANGE UP
EGFR: 90 ML/MIN/1.73M2 — SIGNIFICANT CHANGE UP
EGFR: 90 ML/MIN/1.73M2 — SIGNIFICANT CHANGE UP
EOSINOPHIL # BLD AUTO: 0 K/UL — SIGNIFICANT CHANGE UP (ref 0–0.5)
EOSINOPHIL # BLD AUTO: 0 K/UL — SIGNIFICANT CHANGE UP (ref 0–0.5)
EOSINOPHIL NFR BLD AUTO: 0 % — SIGNIFICANT CHANGE UP (ref 0–6)
EOSINOPHIL NFR BLD AUTO: 0 % — SIGNIFICANT CHANGE UP (ref 0–6)
ESTIMATED AVERAGE GLUCOSE: 131 MG/DL — HIGH (ref 68–114)
ESTIMATED AVERAGE GLUCOSE: 131 MG/DL — HIGH (ref 68–114)
GLUCOSE BLDC GLUCOMTR-MCNC: 158 MG/DL — HIGH (ref 70–99)
GLUCOSE BLDC GLUCOMTR-MCNC: 158 MG/DL — HIGH (ref 70–99)
GLUCOSE BLDC GLUCOMTR-MCNC: 161 MG/DL — HIGH (ref 70–99)
GLUCOSE BLDC GLUCOMTR-MCNC: 161 MG/DL — HIGH (ref 70–99)
GLUCOSE BLDC GLUCOMTR-MCNC: 91 MG/DL — SIGNIFICANT CHANGE UP (ref 70–99)
GLUCOSE BLDC GLUCOMTR-MCNC: 91 MG/DL — SIGNIFICANT CHANGE UP (ref 70–99)
GLUCOSE SERPL-MCNC: 101 MG/DL — HIGH (ref 70–99)
GLUCOSE SERPL-MCNC: 101 MG/DL — HIGH (ref 70–99)
HCT VFR BLD CALC: 36.7 % — SIGNIFICANT CHANGE UP (ref 34.5–45)
HCT VFR BLD CALC: 36.7 % — SIGNIFICANT CHANGE UP (ref 34.5–45)
HGB BLD-MCNC: 12 G/DL — SIGNIFICANT CHANGE UP (ref 11.5–15.5)
HGB BLD-MCNC: 12 G/DL — SIGNIFICANT CHANGE UP (ref 11.5–15.5)
HYPOCHROMIA BLD QL: SLIGHT — SIGNIFICANT CHANGE UP
HYPOCHROMIA BLD QL: SLIGHT — SIGNIFICANT CHANGE UP
LYMPHOCYTES # BLD AUTO: 16.5 % — SIGNIFICANT CHANGE UP (ref 13–44)
LYMPHOCYTES # BLD AUTO: 16.5 % — SIGNIFICANT CHANGE UP (ref 13–44)
LYMPHOCYTES # BLD AUTO: 4.21 K/UL — HIGH (ref 1–3.3)
LYMPHOCYTES # BLD AUTO: 4.21 K/UL — HIGH (ref 1–3.3)
MACROCYTES BLD QL: SLIGHT — SIGNIFICANT CHANGE UP
MACROCYTES BLD QL: SLIGHT — SIGNIFICANT CHANGE UP
MAGNESIUM SERPL-MCNC: 1.8 MG/DL — SIGNIFICANT CHANGE UP (ref 1.6–2.6)
MAGNESIUM SERPL-MCNC: 1.8 MG/DL — SIGNIFICANT CHANGE UP (ref 1.6–2.6)
MANUAL SMEAR VERIFICATION: SIGNIFICANT CHANGE UP
MANUAL SMEAR VERIFICATION: SIGNIFICANT CHANGE UP
MCHC RBC-ENTMCNC: 29.3 PG — SIGNIFICANT CHANGE UP (ref 27–34)
MCHC RBC-ENTMCNC: 29.3 PG — SIGNIFICANT CHANGE UP (ref 27–34)
MCHC RBC-ENTMCNC: 32.7 GM/DL — SIGNIFICANT CHANGE UP (ref 32–36)
MCHC RBC-ENTMCNC: 32.7 GM/DL — SIGNIFICANT CHANGE UP (ref 32–36)
MCV RBC AUTO: 89.5 FL — SIGNIFICANT CHANGE UP (ref 80–100)
MCV RBC AUTO: 89.5 FL — SIGNIFICANT CHANGE UP (ref 80–100)
METAMYELOCYTES # FLD: 2.6 % — HIGH (ref 0–0)
METAMYELOCYTES # FLD: 2.6 % — HIGH (ref 0–0)
MICROCYTES BLD QL: SLIGHT — SIGNIFICANT CHANGE UP
MICROCYTES BLD QL: SLIGHT — SIGNIFICANT CHANGE UP
MONOCYTES # BLD AUTO: 0.89 K/UL — SIGNIFICANT CHANGE UP (ref 0–0.9)
MONOCYTES # BLD AUTO: 0.89 K/UL — SIGNIFICANT CHANGE UP (ref 0–0.9)
MONOCYTES NFR BLD AUTO: 3.5 % — SIGNIFICANT CHANGE UP (ref 2–14)
MONOCYTES NFR BLD AUTO: 3.5 % — SIGNIFICANT CHANGE UP (ref 2–14)
MYELOCYTES NFR BLD: 0.9 % — HIGH (ref 0–0)
MYELOCYTES NFR BLD: 0.9 % — HIGH (ref 0–0)
NEUTROPHILS # BLD AUTO: 18.87 K/UL — HIGH (ref 1.8–7.4)
NEUTROPHILS # BLD AUTO: 18.87 K/UL — HIGH (ref 1.8–7.4)
NEUTROPHILS NFR BLD AUTO: 73.9 % — SIGNIFICANT CHANGE UP (ref 43–77)
NEUTROPHILS NFR BLD AUTO: 73.9 % — SIGNIFICANT CHANGE UP (ref 43–77)
OVALOCYTES BLD QL SMEAR: SLIGHT — SIGNIFICANT CHANGE UP
OVALOCYTES BLD QL SMEAR: SLIGHT — SIGNIFICANT CHANGE UP
PHOSPHATE SERPL-MCNC: 3 MG/DL — SIGNIFICANT CHANGE UP (ref 2.5–4.5)
PHOSPHATE SERPL-MCNC: 3 MG/DL — SIGNIFICANT CHANGE UP (ref 2.5–4.5)
PLAT MORPH BLD: NORMAL — SIGNIFICANT CHANGE UP
PLAT MORPH BLD: NORMAL — SIGNIFICANT CHANGE UP
PLATELET # BLD AUTO: 369 K/UL — SIGNIFICANT CHANGE UP (ref 150–400)
PLATELET # BLD AUTO: 369 K/UL — SIGNIFICANT CHANGE UP (ref 150–400)
POIKILOCYTOSIS BLD QL AUTO: SIGNIFICANT CHANGE UP
POIKILOCYTOSIS BLD QL AUTO: SIGNIFICANT CHANGE UP
POLYCHROMASIA BLD QL SMEAR: SLIGHT — SIGNIFICANT CHANGE UP
POLYCHROMASIA BLD QL SMEAR: SLIGHT — SIGNIFICANT CHANGE UP
POTASSIUM SERPL-MCNC: 4 MMOL/L — SIGNIFICANT CHANGE UP (ref 3.5–5.3)
POTASSIUM SERPL-MCNC: 4 MMOL/L — SIGNIFICANT CHANGE UP (ref 3.5–5.3)
POTASSIUM SERPL-SCNC: 4 MMOL/L — SIGNIFICANT CHANGE UP (ref 3.5–5.3)
POTASSIUM SERPL-SCNC: 4 MMOL/L — SIGNIFICANT CHANGE UP (ref 3.5–5.3)
RBC # BLD: 4.1 M/UL — SIGNIFICANT CHANGE UP (ref 3.8–5.2)
RBC # BLD: 4.1 M/UL — SIGNIFICANT CHANGE UP (ref 3.8–5.2)
RBC # FLD: 14.2 % — SIGNIFICANT CHANGE UP (ref 10.3–14.5)
RBC # FLD: 14.2 % — SIGNIFICANT CHANGE UP (ref 10.3–14.5)
RBC BLD AUTO: ABNORMAL
RBC BLD AUTO: ABNORMAL
RH IG SCN BLD-IMP: POSITIVE — SIGNIFICANT CHANGE UP
RH IG SCN BLD-IMP: POSITIVE — SIGNIFICANT CHANGE UP
SODIUM SERPL-SCNC: 143 MMOL/L — SIGNIFICANT CHANGE UP (ref 135–145)
SODIUM SERPL-SCNC: 143 MMOL/L — SIGNIFICANT CHANGE UP (ref 135–145)
VARIANT LYMPHS # BLD: 2.6 % — SIGNIFICANT CHANGE UP (ref 0–6)
VARIANT LYMPHS # BLD: 2.6 % — SIGNIFICANT CHANGE UP (ref 0–6)
WBC # BLD: 25.53 K/UL — HIGH (ref 3.8–10.5)
WBC # BLD: 25.53 K/UL — HIGH (ref 3.8–10.5)
WBC # FLD AUTO: 25.53 K/UL — HIGH (ref 3.8–10.5)
WBC # FLD AUTO: 25.53 K/UL — HIGH (ref 3.8–10.5)

## 2023-12-07 PROCEDURE — 99232 SBSQ HOSP IP/OBS MODERATE 35: CPT

## 2023-12-07 PROCEDURE — 93306 TTE W/DOPPLER COMPLETE: CPT | Mod: 26

## 2023-12-07 PROCEDURE — 99232 SBSQ HOSP IP/OBS MODERATE 35: CPT | Mod: GC

## 2023-12-07 RX ORDER — LEVALBUTEROL 1.25 MG/.5ML
1.25 SOLUTION, CONCENTRATE RESPIRATORY (INHALATION) EVERY 4 HOURS
Refills: 0 | Status: DISCONTINUED | OUTPATIENT
Start: 2023-12-07 | End: 2023-12-10

## 2023-12-07 RX ORDER — IPRATROPIUM BROMIDE 0.2 MG/ML
500 SOLUTION, NON-ORAL INHALATION EVERY 4 HOURS
Refills: 0 | Status: DISCONTINUED | OUTPATIENT
Start: 2023-12-07 | End: 2023-12-10

## 2023-12-07 RX ORDER — MAGNESIUM SULFATE 500 MG/ML
2 VIAL (ML) INJECTION ONCE
Refills: 0 | Status: COMPLETED | OUTPATIENT
Start: 2023-12-07 | End: 2023-12-07

## 2023-12-07 RX ADMIN — LEVALBUTEROL 1.25 MILLIGRAM(S): 1.25 SOLUTION, CONCENTRATE RESPIRATORY (INHALATION) at 18:08

## 2023-12-07 RX ADMIN — Medication 500 MICROGRAM(S): at 22:22

## 2023-12-07 RX ADMIN — Medication 40 MILLIGRAM(S): at 07:00

## 2023-12-07 RX ADMIN — Medication 500 MICROGRAM(S): at 17:40

## 2023-12-07 RX ADMIN — AZITHROMYCIN 255 MILLIGRAM(S): 500 TABLET, FILM COATED ORAL at 19:29

## 2023-12-07 RX ADMIN — Medication 3 MILLILITER(S): at 03:10

## 2023-12-07 RX ADMIN — CEFTRIAXONE 100 MILLIGRAM(S): 500 INJECTION, POWDER, FOR SOLUTION INTRAMUSCULAR; INTRAVENOUS at 17:39

## 2023-12-07 RX ADMIN — LEVALBUTEROL 1.25 MILLIGRAM(S): 1.25 SOLUTION, CONCENTRATE RESPIRATORY (INHALATION) at 22:22

## 2023-12-07 RX ADMIN — Medication 3 MILLILITER(S): at 09:53

## 2023-12-07 RX ADMIN — Medication 2: at 18:08

## 2023-12-07 RX ADMIN — Medication 3 MILLILITER(S): at 06:59

## 2023-12-07 RX ADMIN — Medication 25 GRAM(S): at 09:53

## 2023-12-07 RX ADMIN — Medication 3 MILLILITER(S): at 14:25

## 2023-12-07 RX ADMIN — PANTOPRAZOLE SODIUM 40 MILLIGRAM(S): 20 TABLET, DELAYED RELEASE ORAL at 07:00

## 2023-12-07 NOTE — PROGRESS NOTE ADULT - PROBLEM SELECTOR PLAN 4
Pt with known severe emphysema, asymptomatic at baseline not on maintenance inhalers. Follows with Dr. Zapien. Baseline O2 >95%. RSV positive in ED, likely trigger for COPD exacerbation. Patient desaturated to SpO2 88%in the ED. AHRF due to RSV infection and superimposed bacterial pneumonia.    - ceftriaxone 1g q24hrs increased to ceftriaxone 2g q24hrs  - c/w azithromycin 500mg q24hrs  - See COPD management above. Pt with known severe emphysema, asymptomatic at baseline not on maintenance inhalers. Follows with Dr. Zapien. Baseline O2 >95%. RSV positive in ED, likely trigger for COPD exacerbation. Patient desaturated to SpO2 88%in the ED. AHRF due to RSV infection and superimposed bacterial pneumonia.    - c/w ceftriaxone 2g q24hrs  - c/w azithromycin 500mg q24hrs  - See COPD management above.

## 2023-12-07 NOTE — PROGRESS NOTE ADULT - PROBLEM SELECTOR PLAN 1
Tachy to 124, leukocytosis to 14.79, with viral and bacterial pneumonia source all present on admission. Received 1L ns bolus in ED. Also RSV+  CXR: Suspected emphysema. Subtle opacities left basal. Findings can be correlated with CT chest.  Procal 1.17  Leukocytosis downtrending from to 27.54 from 25.53.    - c/w ceftriaxone 2g q24hrs x 5 days (12/5-12/9)  - c/w azithromycin 500mg q24hrs x 5 days (12/5-12/9)

## 2023-12-07 NOTE — PHYSICAL THERAPY INITIAL EVALUATION ADULT - GAIT DEVIATIONS NOTED, PT EVAL
Pt w/ appropriate aquilino/step length, steady gait, no LOB/knee buckling noted; good negotiation through hallway obstacles without gait disturbances noted. SpO2 desat to 84% while amb on RA however no SOB observed.

## 2023-12-07 NOTE — PROGRESS NOTE ADULT - PROBLEM SELECTOR PLAN 3
RSV positive in ED, likely trigger for COPD exacerbation. With superimposed bacterial infection  Pro- sid 1.17    - ceftriaxone 1g q24hrs increased to ceftriaxone 2g q24hrs  - c/w azithromycin 500mg q24hrs RSV positive in ED, likely trigger for COPD exacerbation. With superimposed bacterial infection  Pro- sid 1.17    - c/w ceftriaxone 2g q24hrs  - c/w azithromycin 500mg q24hrs

## 2023-12-07 NOTE — PROGRESS NOTE ADULT - SUBJECTIVE AND OBJECTIVE BOX
*****INCOMPLETE NOTE*****    INTERVAL HPI/OVERNIGHT EVENTS:    SUBJECTIVE: Patient seen and examined at bedside, resting comfortably in bed, and does not appear to be in any acute distress. Patient states  Patient denies any recent fevers, chills, nausea, vomiting, headache, acute sob, chest pain, abdominal pain, genitourinary sx, extremity pain or swelling.     ANTIBIOTICS/RELEVANT:    MEDICATIONS  (STANDING):  albuterol/ipratropium for Nebulization 3 milliLiter(s) Nebulizer every 4 hours  azithromycin  IVPB      azithromycin  IVPB 500 milliGRAM(s) IV Intermittent every 24 hours  budesonide 160 MICROgram(s)/formoterol 4.5 MICROgram(s) Inhaler 2 Puff(s) Inhalation two times a day  cefTRIAXone   IVPB 2000 milliGRAM(s) IV Intermittent every 24 hours  dextrose 5%. 1000 milliLiter(s) (100 mL/Hr) IV Continuous <Continuous>  dextrose 5%. 1000 milliLiter(s) (50 mL/Hr) IV Continuous <Continuous>  dextrose 50% Injectable 25 Gram(s) IV Push once  dextrose 50% Injectable 25 Gram(s) IV Push once  dextrose 50% Injectable 12.5 Gram(s) IV Push once  enoxaparin Injectable 40 milliGRAM(s) SubCutaneous every 24 hours  glucagon  Injectable 1 milliGRAM(s) IntraMuscular once  insulin lispro (ADMELOG) corrective regimen sliding scale   SubCutaneous three times a day before meals  pantoprazole    Tablet 40 milliGRAM(s) Oral before breakfast  predniSONE   Tablet 40 milliGRAM(s) Oral every 24 hours    MEDICATIONS  (PRN):  acetaminophen     Tablet .. 975 milliGRAM(s) Oral every 6 hours PRN Temp greater or equal to 38C (100.4F), Mild Pain (1 - 3)  dextrose Oral Gel 15 Gram(s) Oral once PRN Blood Glucose LESS THAN 70 milliGRAM(s)/deciliter  sodium chloride 0.65% Nasal 1 Spray(s) Both Nostrils three times a day PRN Nasal Congestion      Vital Signs Last 24 Hrs  T(C): 37.1 (07 Dec 2023 05:12), Max: 37.3 (07 Dec 2023 00:02)  T(F): 98.7 (07 Dec 2023 05:12), Max: 99.1 (07 Dec 2023 00:02)  HR: 81 (07 Dec 2023 05:12) (72 - 120)  BP: 123/74 (07 Dec 2023 05:12) (106/76 - 154/74)  BP(mean): 93 (07 Dec 2023 05:12) (79 - 106)  RR: 31 (07 Dec 2023 05:12) (21 - 31)  SpO2: 95% (07 Dec 2023 05:12) (89% - 99%)    Parameters below as of 07 Dec 2023 05:12  Patient On (Oxygen Delivery Method): nasal cannula w/ humidification  O2 Flow (L/min): 2      PHYSICAL EXAM:  General: in no acute distress  Eyes: EOMI intact bilaterally. Anicteric sclerae, moist conjunctivae  HENT: Moist mucous membranes  Neck: Trachea midline, supple  Lungs: CTA B/L. No wheezes, rales, or rhonchi  Cardiovascular: RRR. No murmurs, rubs, or gallops  Abdomen: Soft, non-tender non-distended; No rebound or guarding  Extremities: WWP, No clubbing, cyanosis or edema  Neurological: Alert and oriented  Skin: Warm and dry. No obvious rash     LABS:                        11.3   27.54 )-----------( 328      ( 06 Dec 2023 05:30 )             33.2     12-06    143  |  106  |  26<H>  ----------------------------<  139<H>  3.9   |  24  |  0.61    Ca    9.8      06 Dec 2023 05:30  Phos  3.0     12-06  Mg     1.9     12-06        Urinalysis Basic - ( 06 Dec 2023 05:30 )    Color: x / Appearance: x / SG: x / pH: x  Gluc: 139 mg/dL / Ketone: x  / Bili: x / Urobili: x   Blood: x / Protein: x / Nitrite: x   Leuk Esterase: x / RBC: x / WBC x   Sq Epi: x / Non Sq Epi: x / Bacteria: x        MICROBIOLOGY:    RADIOLOGY & ADDITIONAL STUDIES: HOSPITAL COURSE:  74 y/o f hx emphysema, HLD, osteoporosis presents c/o cough, congestion, sore throat x 1 week and SOB for 2 days, found to be in COPD exacerbation 2/2 RSV infection with superimposed CAP. On 7lach, patient was given prednisone 40mg, duonebs standing q4, ctx 1g and azithromycin 500mg. Patient weaned down to 2L. Patient respiratory status improving. Patient will continue CTX and azithromycin. Patient accepted to for transfer to regional medical floors.    INTERVAL HPI/OVERNIGHT EVENTS: maddie    SUBJECTIVE: Patient seen and examined at bedside, resting comfortably in bed, and does not appear to be in any acute distress. Patient states that her shortness of breath and cough improved. States that she had a bowel movement this morning. Denies any chest pain, or pleuritic pain.    MEDICATIONS  (STANDING):  albuterol/ipratropium for Nebulization 3 milliLiter(s) Nebulizer every 4 hours  azithromycin  IVPB      azithromycin  IVPB 500 milliGRAM(s) IV Intermittent every 24 hours  budesonide 160 MICROgram(s)/formoterol 4.5 MICROgram(s) Inhaler 2 Puff(s) Inhalation two times a day  cefTRIAXone   IVPB 2000 milliGRAM(s) IV Intermittent every 24 hours  dextrose 5%. 1000 milliLiter(s) (100 mL/Hr) IV Continuous <Continuous>  dextrose 5%. 1000 milliLiter(s) (50 mL/Hr) IV Continuous <Continuous>  dextrose 50% Injectable 25 Gram(s) IV Push once  dextrose 50% Injectable 25 Gram(s) IV Push once  dextrose 50% Injectable 12.5 Gram(s) IV Push once  enoxaparin Injectable 40 milliGRAM(s) SubCutaneous every 24 hours  glucagon  Injectable 1 milliGRAM(s) IntraMuscular once  insulin lispro (ADMELOG) corrective regimen sliding scale   SubCutaneous three times a day before meals  pantoprazole    Tablet 40 milliGRAM(s) Oral before breakfast  predniSONE   Tablet 40 milliGRAM(s) Oral every 24 hours    MEDICATIONS  (PRN):  acetaminophen     Tablet .. 975 milliGRAM(s) Oral every 6 hours PRN Temp greater or equal to 38C (100.4F), Mild Pain (1 - 3)  dextrose Oral Gel 15 Gram(s) Oral once PRN Blood Glucose LESS THAN 70 milliGRAM(s)/deciliter  sodium chloride 0.65% Nasal 1 Spray(s) Both Nostrils three times a day PRN Nasal Congestion    Vital Signs Last 24 Hrs  T(C): 37.1 (07 Dec 2023 05:12), Max: 37.3 (07 Dec 2023 00:02)  T(F): 98.7 (07 Dec 2023 05:12), Max: 99.1 (07 Dec 2023 00:02)  HR: 81 (07 Dec 2023 05:12) (72 - 120)  BP: 123/74 (07 Dec 2023 05:12) (106/76 - 154/74)  BP(mean): 93 (07 Dec 2023 05:12) (79 - 106)  RR: 31 (07 Dec 2023 05:12) (21 - 31)  SpO2: 95% (07 Dec 2023 05:12) (89% - 99%)    Parameters below as of 07 Dec 2023 05:12  Patient On (Oxygen Delivery Method): nasal cannula w/ humidification  O2 Flow (L/min): 2    PHYSICAL EXAM:  General: in no acute distress  Eyes: EOMI intact bilaterally, no thrush noted, pupils 6mm bilateral and briskly reactive to light  HENT: Moist mucous membranes  Neck: Trachea midline  Lungs: CTA B/L. No wheezes  Cardiovascular: RRR  Abdomen: Firm, nontender, non tympanic  Extremities: WWP, No cyanosis  Neurological: Alert and oriented, isolated left facial nerve palsy  Skin: Warm and dry, skin tenting    LABS:                        11.3   27.54 )-----------( 328      ( 06 Dec 2023 05:30 )             33.2     12-06    143  |  106  |  26<H>  ----------------------------<  139<H>  3.9   |  24  |  0.61    Ca    9.8      06 Dec 2023 05:30  Phos  3.0     12-06  Mg     1.9     12-06        Urinalysis Basic - ( 06 Dec 2023 05:30 )    Color: x / Appearance: x / SG: x / pH: x  Gluc: 139 mg/dL / Ketone: x  / Bili: x / Urobili: x   Blood: x / Protein: x / Nitrite: x   Leuk Esterase: x / RBC: x / WBC x   Sq Epi: x / Non Sq Epi: x / Bacteria: x        MICROBIOLOGY:    RADIOLOGY & ADDITIONAL STUDIES:

## 2023-12-07 NOTE — PROGRESS NOTE ADULT - PROBLEM SELECTOR PLAN 6
Fluids: 500cc LR  Electrolytes: Replete as needed  Nutrition: regular diet  Dvt ppx: Lovenox 40mg q24  Code status: FULL code  Dispo: TBD

## 2023-12-07 NOTE — PROGRESS NOTE ADULT - SUBJECTIVE AND OBJECTIVE BOX
***Transfer from medicine telemetry to medicine floors acceptance***    74 y/o f hx emphysema, HLD, osteoporosis presents c/o cough, congestion, sore throat x 1 week and SOB for 2 days, found to be in COPD exacerbation 2/2 RSV infection with superimposed CAP. On 7lach, patient was given prednisone 40mg, duonebs standing q4, ctx 1g and azithromycin 500mg. Patient weaned down to 2L. Patient respiratory status improving. Patient will continue CTX and azithromycin. Patient accepted to for transfer to regional medical floors.    SUBJECTIVE/INTERVAL EVENTS: No acuteinterval events events. Pt seen at bedside, resting comfortably in bed, and does not appear to be in any acute distress. When asked, pt denies any recent or active fever, chills, nausea, vomiting, headache, chest pain, abdominal pain, genitourinary sx, extremity pain or swelling.    VITAL SIGNS:  Vital Signs Last 24 Hrs  T(C): 36.8 (07 Dec 2023 14:15), Max: 37.3 (07 Dec 2023 00:02)  T(F): 98.2 (07 Dec 2023 14:15), Max: 99.1 (07 Dec 2023 00:02)  HR: 98 (07 Dec 2023 14:15) (81 - 120)  BP: 129/66 (07 Dec 2023 14:15) (112/58 - 154/74)  BP(mean): 90 (07 Dec 2023 14:15) (79 - 106)  RR: 27 (07 Dec 2023 14:15) (21 - 31)  SpO2: 94% (07 Dec 2023 14:15) (89% - 96%)    Parameters below as of 07 Dec 2023 14:15  Patient On (Oxygen Delivery Method): nasal cannula w/ humidification  O2 Flow (L/min): 2    PHYSICAL EXAM:  General: in no acute distress  Eyes: EOMI intact bilaterally, no thrush noted, pupils 6mm bilateral and briskly reactive to light  HENT: Moist mucous membranes  Neck: Trachea midline  Lungs: CTA B/L. No wheezes  Cardiovascular: RRR  Abdomen: Firm, nontender, non tympanic  Extremities: WWP, No cyanosis  Neurological: Alert and oriented, isolated left facial nerve palsy  Skin: Warm and dry, skin     MEDICATIONS:  MEDICATIONS  (STANDING):  albuterol/ipratropium for Nebulization 3 milliLiter(s) Nebulizer every 4 hours  azithromycin  IVPB      azithromycin  IVPB 500 milliGRAM(s) IV Intermittent every 24 hours  budesonide 160 MICROgram(s)/formoterol 4.5 MICROgram(s) Inhaler 2 Puff(s) Inhalation two times a day  cefTRIAXone   IVPB 2000 milliGRAM(s) IV Intermittent every 24 hours  dextrose 5%. 1000 milliLiter(s) (100 mL/Hr) IV Continuous <Continuous>  dextrose 5%. 1000 milliLiter(s) (50 mL/Hr) IV Continuous <Continuous>  dextrose 50% Injectable 25 Gram(s) IV Push once  dextrose 50% Injectable 25 Gram(s) IV Push once  dextrose 50% Injectable 12.5 Gram(s) IV Push once  enoxaparin Injectable 40 milliGRAM(s) SubCutaneous every 24 hours  glucagon  Injectable 1 milliGRAM(s) IntraMuscular once  insulin lispro (ADMELOG) corrective regimen sliding scale   SubCutaneous three times a day before meals  pantoprazole    Tablet 40 milliGRAM(s) Oral before breakfast  predniSONE   Tablet 40 milliGRAM(s) Oral every 24 hours    MEDICATIONS  (PRN):  acetaminophen     Tablet .. 975 milliGRAM(s) Oral every 6 hours PRN Temp greater or equal to 38C (100.4F), Mild Pain (1 - 3)  dextrose Oral Gel 15 Gram(s) Oral once PRN Blood Glucose LESS THAN 70 milliGRAM(s)/deciliter  sodium chloride 0.65% Nasal 1 Spray(s) Both Nostrils three times a day PRN Nasal Congestion      ALLERGIES:  Allergies    No Known Allergies    Intolerances        LABS:                        12.0   25.53 )-----------( 369      ( 07 Dec 2023 07:34 )             36.7     12-07    143  |  102  |  29<H>  ----------------------------<  101<H>  4.0   |  30  |  0.71    Ca    9.5      07 Dec 2023 07:34  Phos  3.0     12-07  Mg     1.8     12-07          RADIOLOGY & ADDITIONAL TESTS: Reviewed. ***Transfer from medicine telemetry to medicine floors acceptance***    74 y/o f hx emphysema, HLD, osteoporosis presents c/o cough, congestion, sore throat x 1 week and SOB for 2 days, found to be in COPD exacerbation 2/2 RSV infection with superimposed CAP. On 7lach, patient was given prednisone 40mg, duonebs standing q4, ctx 1g and azithromycin 500mg. Patient weaned down to 2L. Patient respiratory status improving. Patient will continue CTX and azithromycin. Patient accepted to for transfer to regional medical floors.    SUBJECTIVE/INTERVAL EVENTS: No acuteinterval events events. Pt seen at bedside, resting comfortably in bed, and does not appear to be in any acute distress. When asked, pt denies any recent or active fever, chills, nausea, vomiting, headache, chest pain, abdominal pain, genitourinary sx, extremity pain or swelling.    VITAL SIGNS:  Vital Signs Last 24 Hrs  T(C): 36.8 (07 Dec 2023 14:15), Max: 37.3 (07 Dec 2023 00:02)  T(F): 98.2 (07 Dec 2023 14:15), Max: 99.1 (07 Dec 2023 00:02)  HR: 98 (07 Dec 2023 14:15) (81 - 120)  BP: 129/66 (07 Dec 2023 14:15) (112/58 - 154/74)  BP(mean): 90 (07 Dec 2023 14:15) (79 - 106)  RR: 27 (07 Dec 2023 14:15) (21 - 31)  SpO2: 94% (07 Dec 2023 14:15) (89% - 96%)    Parameters below as of 07 Dec 2023 14:15  Patient On (Oxygen Delivery Method): nasal cannula w/ humidification  O2 Flow (L/min): 2    PHYSICAL EXAM:  General: in no acute distress  Eyes: EOMI intact bilaterally, no thrush noted, pupils 6mm bilateral and briskly reactive to light  HENT: Moist mucous membranes  Neck: Trachea midline  Lungs: CTA B/L. No wheezes  Cardiovascular: RRR  Abdomen: Firm, nontender, non tympanic  Extremities: WWP, No cyanosis  Neurological: Alert and oriented, isolated left facial nerve palsy with facial twitch  Skin: Warm and dry, skin     MEDICATIONS:  MEDICATIONS  (STANDING):  albuterol/ipratropium for Nebulization 3 milliLiter(s) Nebulizer every 4 hours  azithromycin  IVPB      azithromycin  IVPB 500 milliGRAM(s) IV Intermittent every 24 hours  budesonide 160 MICROgram(s)/formoterol 4.5 MICROgram(s) Inhaler 2 Puff(s) Inhalation two times a day  cefTRIAXone   IVPB 2000 milliGRAM(s) IV Intermittent every 24 hours  dextrose 5%. 1000 milliLiter(s) (100 mL/Hr) IV Continuous <Continuous>  dextrose 5%. 1000 milliLiter(s) (50 mL/Hr) IV Continuous <Continuous>  dextrose 50% Injectable 25 Gram(s) IV Push once  dextrose 50% Injectable 25 Gram(s) IV Push once  dextrose 50% Injectable 12.5 Gram(s) IV Push once  enoxaparin Injectable 40 milliGRAM(s) SubCutaneous every 24 hours  glucagon  Injectable 1 milliGRAM(s) IntraMuscular once  insulin lispro (ADMELOG) corrective regimen sliding scale   SubCutaneous three times a day before meals  pantoprazole    Tablet 40 milliGRAM(s) Oral before breakfast  predniSONE   Tablet 40 milliGRAM(s) Oral every 24 hours    MEDICATIONS  (PRN):  acetaminophen     Tablet .. 975 milliGRAM(s) Oral every 6 hours PRN Temp greater or equal to 38C (100.4F), Mild Pain (1 - 3)  dextrose Oral Gel 15 Gram(s) Oral once PRN Blood Glucose LESS THAN 70 milliGRAM(s)/deciliter  sodium chloride 0.65% Nasal 1 Spray(s) Both Nostrils three times a day PRN Nasal Congestion      ALLERGIES:  Allergies    No Known Allergies    Intolerances        LABS:                        12.0   25.53 )-----------( 369      ( 07 Dec 2023 07:34 )             36.7     12-07    143  |  102  |  29<H>  ----------------------------<  101<H>  4.0   |  30  |  0.71    Ca    9.5      07 Dec 2023 07:34  Phos  3.0     12-07  Mg     1.8     12-07          RADIOLOGY & ADDITIONAL TESTS: Reviewed.

## 2023-12-07 NOTE — PHYSICAL THERAPY INITIAL EVALUATION ADULT - ADDITIONAL COMMENTS
Pt currently resides alone in elevator apt. Primarily amb indep w/o AD. Denied falls within past 6 months. Indep w/ showering and dressing tasks. Denied home O2, denied HHA.

## 2023-12-07 NOTE — PROGRESS NOTE ADULT - PROBLEM SELECTOR PLAN 2
Pt with known severe emphysema, asymptomatic at baseline not on maintenance inhalers. Follows with Dr. Zapien. Baseline O2 >95%. RSV positive in ED, likely trigger for COPD exacerbation. Not on home o2  Legionella mrsa and strep negative  Pro-sid 1.17    - C/w prednisone 40mg daily x 5 days (12/5-12/9)  - abx as above  - Xopenex/ipratropium nebs standing q4h  - c/w 2L nc for supplemental O2, goal SpO2 88-92%.

## 2023-12-07 NOTE — PROGRESS NOTE ADULT - PROBLEM SELECTOR PLAN 2
Pt with known severe emphysema, asymptomatic at baseline not on maintenance inhalers. Follows with Dr. Zapien. Baseline O2 >95%. RSV positive in ED, likely trigger for COPD exacerbation. Not on home o2  Legionella mrsa and strep negative  Pro-sid 1.17    - C/w prednisone 40mg daily  - C/w duonebs standing q4  - ceftriaxone 1g q24hrs increased to ceftriaxone 2g q24hrs  - c/w azithromycin 500mg q24hrs  - 3L NC weaned to 2L nc for supplemental O2, goal SpO2 88-92%. Pt with known severe emphysema, asymptomatic at baseline not on maintenance inhalers. Follows with Dr. Zapien. Baseline O2 >95%. RSV positive in ED, likely trigger for COPD exacerbation. Not on home o2  Legionella mrsa and strep negative  Pro-sid 1.17    - C/w prednisone 40mg daily  - C/w duonebs standing q4  - c/w ceftriaxone 2g q24hrs  - c/w azithromycin 500mg q24hrs  - c/w 2L nc for supplemental O2, goal SpO2 88-92%.

## 2023-12-07 NOTE — PROGRESS NOTE ADULT - ASSESSMENT
74 y/o f hx emphysema, HLD, osteoporosis presents c/o cough, congestion, sore throat x 1 week and SOB for 2 days, found to be in COPD exacerbation 2/2 RSV infection with superimposed CAP. Patient with sepsis secondary to RSV pneumonia and possibly CAP, with acute hypoxemic respiratory failure secondary to RSV pneumonia and possibly CAP with COPD exacerbation all present on admission. 72 y/o f hx emphysema, HLD, osteoporosis presents c/o cough, congestion, sore throat x 1 week and SOB for 2 days, found to be in COPD exacerbation 2/2 RSV infection with superimposed CAP. Patient with sepsis secondary to RSV pneumonia and possibly CAP, with acute hypoxemic respiratory failure secondary to RSV pneumonia and possibly CAP with COPD exacerbation all present on admission.

## 2023-12-07 NOTE — PROGRESS NOTE ADULT - PROBLEM SELECTOR PLAN 5
2/2 RSV infection  - Ok to monitor, can give cough suppressants if cough is bothersome 2/2 RSV infection and bacterial pneumonia    - Ok to monitor, can give cough suppressants if cough is bothersome

## 2023-12-07 NOTE — PROGRESS NOTE ADULT - PROBLEM SELECTOR PLAN 1
Tachy to 124, leukocytosis to 14.79, with viral and bacterial pneumonia source all present on admission. Received 1L ns bolus in ED.  CXR: Suspected emphysema. Subtle opacities left basal. Findings can be correlated with CT chest.  Procal 1.17  Leukocytosis elevated to 27.54 from 18.08    - ceftriaxone 1g q24hrs increased to ceftriaxone 2g q24hrs  - c/w azithromycin 500mg q24hrs Tachy to 124, leukocytosis to 14.79, with viral and bacterial pneumonia source all present on admission. Received 1L ns bolus in ED.  CXR: Suspected emphysema. Subtle opacities left basal. Findings can be correlated with CT chest.  Procal 1.17  Leukocytosis downtrending from to 27.54 from 25.53.    - c/w ceftriaxone 2g q24hrs  - c/w azithromycin 500mg q24hrs

## 2023-12-07 NOTE — PROGRESS NOTE ADULT - PROBLEM SELECTOR PLAN 4
2/2 RSV infection and bacterial pneumonia    - Ok to monitor, can give cough suppressants if cough is bothersome

## 2023-12-07 NOTE — PROGRESS NOTE ADULT - ASSESSMENT
74 y/o f hx emphysema, HLD, osteoporosis presents c/o cough, congestion, sore throat x 1 week and SOB for 2 days, found to be in COPD exacerbation 2/2 RSV infection with superimposed CAP.  Patient with sepsis secondary to RSV pneumonia and possibly CAP, with acute hypoxemic respiratory failure secondary to RSV pneumonia and possibly CAP with COPD exacerbation all present on admission 72 y/o f hx emphysema, HLD, osteoporosis presents c/o cough, congestion, sore throat x 1 week and SOB for 2 days, found to be in COPD exacerbation 2/2 RSV infection with superimposed CAP. Patient with sepsis secondary to RSV pneumonia and possibly CAP, with acute hypoxemic respiratory failure secondary to RSV pneumonia and possibly CAP with COPD exacerbation all present on admission. 74 y/o f hx emphysema, HLD, osteoporosis presents c/o cough, congestion, sore throat x 1 week and SOB for 2 days, found to be in COPD exacerbation 2/2 RSV infection with superimposed CAP. Patient with sepsis secondary to RSV pneumonia and possibly CAP, with acute hypoxemic respiratory failure secondary to RSV pneumonia and possibly CAP with COPD exacerbation all present on admission.

## 2023-12-07 NOTE — CHART NOTE - NSCHARTNOTEFT_GEN_A_CORE
Admitting Diagnosis:   Patient is a 73y old  Female who presents with a chief complaint of ACUTE DYSPNEA  "74 y/o f hx emphysema, HLD, osteoporosis presents c/o cough, congestion, sore throat x 1 week and SOB for 2 days, found to be in COPD exacerbation 2/2 RSV infection. ICU consulted for respiratory distress."   (05 Dec 2023 11:53)      PAST MEDICAL & SURGICAL HISTORY:  H/O emphysema      HLD (hyperlipidemia)      Parotid tumor  resection      Current Nutrition Order:  Diet, Regular:   Supplement Feeding Modality:  Oral  Ensure Plus High Protein Cans or Servings Per Day:  1       Frequency:  Three Times a day  Ensure Max Cans or Servings Per Day:  1       Frequency:  Three Times a day (12-05-23 @ 14:49)      PO Intake: Good (%) [   ]  Fair (50-75%) [   x]     GI Issues:   LBM today. Pt reports no concerns.     Pain:  Pt denies any pain. No pain noted per flow sheets.      Skin Integrity:   Shiv Score: 20.   No edema or pressure injuries noted per flow sheets.    Labs:   12-07    143  |  102  |  29<H>  ----------------------------<  101<H>  4.0   |  30  |  0.71    Ca    9.5      07 Dec 2023 07:34  Phos  3.0     12-07  Mg     1.8     12-07      CAPILLARY BLOOD GLUCOSE      POCT Blood Glucose.: 158 mg/dL (07 Dec 2023 11:50)  POCT Blood Glucose.: 91 mg/dL (07 Dec 2023 06:25)  POCT Blood Glucose.: 138 mg/dL (06 Dec 2023 21:36)      Medications:  MEDICATIONS  (STANDING):  azithromycin  IVPB      azithromycin  IVPB 500 milliGRAM(s) IV Intermittent every 24 hours  budesonide 160 MICROgram(s)/formoterol 4.5 MICROgram(s) Inhaler 2 Puff(s) Inhalation two times a day  cefTRIAXone   IVPB 2000 milliGRAM(s) IV Intermittent every 24 hours  dextrose 5%. 1000 milliLiter(s) (100 mL/Hr) IV Continuous <Continuous>  dextrose 5%. 1000 milliLiter(s) (50 mL/Hr) IV Continuous <Continuous>  dextrose 50% Injectable 25 Gram(s) IV Push once  dextrose 50% Injectable 25 Gram(s) IV Push once  dextrose 50% Injectable 12.5 Gram(s) IV Push once  enoxaparin Injectable 40 milliGRAM(s) SubCutaneous every 24 hours  glucagon  Injectable 1 milliGRAM(s) IntraMuscular once  insulin lispro (ADMELOG) corrective regimen sliding scale   SubCutaneous three times a day before meals  ipratropium    for Nebulization 500 MICROGram(s) Nebulizer every 4 hours  levalbuterol Inhalation 1.25 milliGRAM(s) Inhalation every 4 hours  pantoprazole    Tablet 40 milliGRAM(s) Oral before breakfast  predniSONE   Tablet 40 milliGRAM(s) Oral every 24 hours    MEDICATIONS  (PRN):  acetaminophen     Tablet .. 975 milliGRAM(s) Oral every 6 hours PRN Temp greater or equal to 38C (100.4F), Mild Pain (1 - 3)  dextrose Oral Gel 15 Gram(s) Oral once PRN Blood Glucose LESS THAN 70 milliGRAM(s)/deciliter  sodium chloride 0.65% Nasal 1 Spray(s) Both Nostrils three times a day PRN Nasal Congestion    Anthropometrics:  Height for BMI (FEET)	5 Feet  Height for BMI (INCHES)	0 Inch(s)  Height for BMI (CENTIMETERS)	152.4 Centimeter(s)  Weight for BMI (lbs)	89 lb  Weight for BMI (kg)	40.4 kg  Body Mass Index	17.3    Nutrition Focused Physical Exam: Completed [  x ]    Muscle Wasting- Temporal [  x ]  Clavicle/Pectoral [ x  ]  Shoulder/Deltoid [  X ]  Interosseous [ x  ]    Fat Wasting- Orbital [ x  ]  Buccal [  x ]  Triceps [  x ]      Estimated energy needs:   Estimated Energy Needs Weight (lbs)	89 lb  Estimated Energy Needs Weight (kg)	40.3 kg  Estimated Energy Needs From (sid/kg)	33  Estimated Energy Needs To (sid/kg)	37  Estimated Energy Needs Calculated From (sid/kg)	1329  Estimated Energy Needs Calculated To (sid/kg)	1491  Weight used for calculations	current weight    Estimated Protein Needs Weight (lbs)	89 lb  Estimated Protein Needs Weight (kg)	40.3 kg  Estimated Protein Needs From (g/kg)	1.4  Estimated Protein Needs To (g/kg)	1.6  Estimated Protein Needs Calculated From (g/kg)	56.42  Estimated Protein Needs Calculated To (g/kg)	64.48    Other Calculations	Based on Standards of Care pt within % IBW (89%) thus actual body weight (40.3kg) used for all calculations. Needs adjusted for advanced age and malnutrition, COPD. Fluid recs per team.    Subjective:   "74 y/o f hx emphysema, HLD, osteoporosis presents c/o cough, congestion, sore throat x 1 week and SOB for 2 days, found to be in COPD exacerbation 2/2 RSV infection. ICU consulted for respiratory distress."     Pt seen by RD on 5LA at bedside- now on 4UR. Current Diet: Regular with Ensure Plus HP TID. Pt reports having an appetite. Notes consuming <50% of 3 meals/d due to incorrect meal items, cold food, and overall finding the food not appetizing d/t taste/quality. Pt expressed frustration over  to RD. Pt consumes chocolate ice cream with each meal and reports drinking 3 Chocolate Ensure yesterday. RD notes 3 Vanilla Ensure unopened- pt will not drink vanilla ONS. Half a fruit cup and blueberry muffin from breakfast were noted to be on pt table. Also received raisins instead of raisin bran cereal which pt did not eat. Denies N/V/Pain. LBM today. RD encouraged pt to order foods that are appetizing and to continue consuming ONS in between meals- RD noted preference for chocolate ONS in [facility]. Pt comments will be mentioned to staff. RD will continue to follow up. Please see recommendations below.      Previous Nutrition Diagnosis: Severe Chronic Malnutrition R/T inadequate oral intake insetting of increased demand for nutrients AEB moderate/severe muscle and fat depletions, COPD, BMI<19    Active [ x  ]      Goal: 1. Pt to meet >75% of estimated nutrition needs daily per course of hospitalization.    Recommendations:  1. Continue current diet as tolerated: Regular. Fluids deferred to medical team. Defer diet texture/fluid consistencies to team.   2. Continue oral nutrition supplement: Ensure Plus High protein daily 3x/day (350 kcal, 20 g protein in each) to optimize intake.  3. Encourage PO intake and honor food preferences as able unless otherwise contraindicated.   >>Recommend Chocolate ONS only per pt request.   4. Monitor PO intake, diet tolerance, weight trends, labs, and skin integrity and bowel movement regularity.   5. RD remains available upon request and will follow-up per protocol.   6. Malnutrition sticker placed in charts.    Education: RD encouraged pt to order foods that are appetizing and to continue consuming ONS in between meals.    Risk Level: High [ x  ]

## 2023-12-07 NOTE — PROGRESS NOTE ADULT - PROBLEM SELECTOR PLAN 3
Pt with known severe emphysema, asymptomatic at baseline not on maintenance inhalers. Follows with Dr. Zapien. Baseline O2 >95%. RSV positive in ED, likely trigger for COPD exacerbation. Patient desaturated to SpO2 88%in the ED. AHRF due to RSV infection and superimposed bacterial pneumonia.    - See COPD management above.

## 2023-12-07 NOTE — PHYSICAL THERAPY INITIAL EVALUATION ADULT - PERTINENT HX OF CURRENT PROBLEM, REHAB EVAL
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 She has also been feeling feverish at home but has not taken her temp at home. At baseline, the patient states that she is able to walk without stopping to catch her breath, and is able to climb a flight of stairs without difficulty. She does not take any inhalers for her emphysema. Follows with Dr. Zapien in pulm. Has 30+ pack year smoking history, quit in 2014. Pt denies chest pain, nausea, vomiting, diarrhea, abdominal pain, constipation, extremity swelling.
<<-----Click here for Discharge Medication Review

## 2023-12-08 ENCOUNTER — TRANSCRIPTION ENCOUNTER (OUTPATIENT)
Age: 73
End: 2023-12-08

## 2023-12-08 LAB
ALBUMIN SERPL ELPH-MCNC: 2.8 G/DL — LOW (ref 3.3–5)
ALBUMIN SERPL ELPH-MCNC: 2.8 G/DL — LOW (ref 3.3–5)
ALP SERPL-CCNC: 88 U/L — SIGNIFICANT CHANGE UP (ref 40–120)
ALP SERPL-CCNC: 88 U/L — SIGNIFICANT CHANGE UP (ref 40–120)
ALT FLD-CCNC: 90 U/L — HIGH (ref 10–45)
ALT FLD-CCNC: 90 U/L — HIGH (ref 10–45)
ANION GAP SERPL CALC-SCNC: 11 MMOL/L — SIGNIFICANT CHANGE UP (ref 5–17)
ANION GAP SERPL CALC-SCNC: 11 MMOL/L — SIGNIFICANT CHANGE UP (ref 5–17)
ANISOCYTOSIS BLD QL: SLIGHT — SIGNIFICANT CHANGE UP
ANISOCYTOSIS BLD QL: SLIGHT — SIGNIFICANT CHANGE UP
AST SERPL-CCNC: 56 U/L — HIGH (ref 10–40)
AST SERPL-CCNC: 56 U/L — HIGH (ref 10–40)
BASOPHILS # BLD AUTO: 0 K/UL — SIGNIFICANT CHANGE UP (ref 0–0.2)
BASOPHILS # BLD AUTO: 0 K/UL — SIGNIFICANT CHANGE UP (ref 0–0.2)
BASOPHILS NFR BLD AUTO: 0 % — SIGNIFICANT CHANGE UP (ref 0–2)
BASOPHILS NFR BLD AUTO: 0 % — SIGNIFICANT CHANGE UP (ref 0–2)
BILIRUB SERPL-MCNC: 0.2 MG/DL — SIGNIFICANT CHANGE UP (ref 0.2–1.2)
BILIRUB SERPL-MCNC: 0.2 MG/DL — SIGNIFICANT CHANGE UP (ref 0.2–1.2)
BUN SERPL-MCNC: 26 MG/DL — HIGH (ref 7–23)
BUN SERPL-MCNC: 26 MG/DL — HIGH (ref 7–23)
CALCIUM SERPL-MCNC: 9.3 MG/DL — SIGNIFICANT CHANGE UP (ref 8.4–10.5)
CALCIUM SERPL-MCNC: 9.3 MG/DL — SIGNIFICANT CHANGE UP (ref 8.4–10.5)
CHLORIDE SERPL-SCNC: 98 MMOL/L — SIGNIFICANT CHANGE UP (ref 96–108)
CHLORIDE SERPL-SCNC: 98 MMOL/L — SIGNIFICANT CHANGE UP (ref 96–108)
CO2 SERPL-SCNC: 29 MMOL/L — SIGNIFICANT CHANGE UP (ref 22–31)
CO2 SERPL-SCNC: 29 MMOL/L — SIGNIFICANT CHANGE UP (ref 22–31)
CREAT SERPL-MCNC: 0.64 MG/DL — SIGNIFICANT CHANGE UP (ref 0.5–1.3)
CREAT SERPL-MCNC: 0.64 MG/DL — SIGNIFICANT CHANGE UP (ref 0.5–1.3)
EGFR: 94 ML/MIN/1.73M2 — SIGNIFICANT CHANGE UP
EGFR: 94 ML/MIN/1.73M2 — SIGNIFICANT CHANGE UP
EOSINOPHIL # BLD AUTO: 0.16 K/UL — SIGNIFICANT CHANGE UP (ref 0–0.5)
EOSINOPHIL # BLD AUTO: 0.16 K/UL — SIGNIFICANT CHANGE UP (ref 0–0.5)
EOSINOPHIL NFR BLD AUTO: 0.9 % — SIGNIFICANT CHANGE UP (ref 0–6)
EOSINOPHIL NFR BLD AUTO: 0.9 % — SIGNIFICANT CHANGE UP (ref 0–6)
GLUCOSE BLDC GLUCOMTR-MCNC: 115 MG/DL — HIGH (ref 70–99)
GLUCOSE BLDC GLUCOMTR-MCNC: 115 MG/DL — HIGH (ref 70–99)
GLUCOSE BLDC GLUCOMTR-MCNC: 119 MG/DL — HIGH (ref 70–99)
GLUCOSE BLDC GLUCOMTR-MCNC: 119 MG/DL — HIGH (ref 70–99)
GLUCOSE BLDC GLUCOMTR-MCNC: 147 MG/DL — HIGH (ref 70–99)
GLUCOSE BLDC GLUCOMTR-MCNC: 147 MG/DL — HIGH (ref 70–99)
GLUCOSE BLDC GLUCOMTR-MCNC: 150 MG/DL — HIGH (ref 70–99)
GLUCOSE BLDC GLUCOMTR-MCNC: 150 MG/DL — HIGH (ref 70–99)
GLUCOSE SERPL-MCNC: 87 MG/DL — SIGNIFICANT CHANGE UP (ref 70–99)
GLUCOSE SERPL-MCNC: 87 MG/DL — SIGNIFICANT CHANGE UP (ref 70–99)
HCT VFR BLD CALC: 36.4 % — SIGNIFICANT CHANGE UP (ref 34.5–45)
HCT VFR BLD CALC: 36.4 % — SIGNIFICANT CHANGE UP (ref 34.5–45)
HGB BLD-MCNC: 11.6 G/DL — SIGNIFICANT CHANGE UP (ref 11.5–15.5)
HGB BLD-MCNC: 11.6 G/DL — SIGNIFICANT CHANGE UP (ref 11.5–15.5)
LYMPHOCYTES # BLD AUTO: 14.9 % — SIGNIFICANT CHANGE UP (ref 13–44)
LYMPHOCYTES # BLD AUTO: 14.9 % — SIGNIFICANT CHANGE UP (ref 13–44)
LYMPHOCYTES # BLD AUTO: 2.61 K/UL — SIGNIFICANT CHANGE UP (ref 1–3.3)
LYMPHOCYTES # BLD AUTO: 2.61 K/UL — SIGNIFICANT CHANGE UP (ref 1–3.3)
MACROCYTES BLD QL: SLIGHT — SIGNIFICANT CHANGE UP
MACROCYTES BLD QL: SLIGHT — SIGNIFICANT CHANGE UP
MAGNESIUM SERPL-MCNC: 1.9 MG/DL — SIGNIFICANT CHANGE UP (ref 1.6–2.6)
MAGNESIUM SERPL-MCNC: 1.9 MG/DL — SIGNIFICANT CHANGE UP (ref 1.6–2.6)
MANUAL SMEAR VERIFICATION: SIGNIFICANT CHANGE UP
MANUAL SMEAR VERIFICATION: SIGNIFICANT CHANGE UP
MCHC RBC-ENTMCNC: 28.6 PG — SIGNIFICANT CHANGE UP (ref 27–34)
MCHC RBC-ENTMCNC: 28.6 PG — SIGNIFICANT CHANGE UP (ref 27–34)
MCHC RBC-ENTMCNC: 31.9 GM/DL — LOW (ref 32–36)
MCHC RBC-ENTMCNC: 31.9 GM/DL — LOW (ref 32–36)
MCV RBC AUTO: 89.9 FL — SIGNIFICANT CHANGE UP (ref 80–100)
MCV RBC AUTO: 89.9 FL — SIGNIFICANT CHANGE UP (ref 80–100)
MONOCYTES # BLD AUTO: 1.07 K/UL — HIGH (ref 0–0.9)
MONOCYTES # BLD AUTO: 1.07 K/UL — HIGH (ref 0–0.9)
MONOCYTES NFR BLD AUTO: 6.1 % — SIGNIFICANT CHANGE UP (ref 2–14)
MONOCYTES NFR BLD AUTO: 6.1 % — SIGNIFICANT CHANGE UP (ref 2–14)
NEUTROPHILS # BLD AUTO: 13.68 K/UL — HIGH (ref 1.8–7.4)
NEUTROPHILS # BLD AUTO: 13.68 K/UL — HIGH (ref 1.8–7.4)
NEUTROPHILS NFR BLD AUTO: 77.2 % — HIGH (ref 43–77)
NEUTROPHILS NFR BLD AUTO: 77.2 % — HIGH (ref 43–77)
NEUTS BAND # BLD: 0.9 % — SIGNIFICANT CHANGE UP (ref 0–8)
NEUTS BAND # BLD: 0.9 % — SIGNIFICANT CHANGE UP (ref 0–8)
OVALOCYTES BLD QL SMEAR: SLIGHT — SIGNIFICANT CHANGE UP
OVALOCYTES BLD QL SMEAR: SLIGHT — SIGNIFICANT CHANGE UP
PHOSPHATE SERPL-MCNC: 4.4 MG/DL — SIGNIFICANT CHANGE UP (ref 2.5–4.5)
PHOSPHATE SERPL-MCNC: 4.4 MG/DL — SIGNIFICANT CHANGE UP (ref 2.5–4.5)
PLAT MORPH BLD: NORMAL — SIGNIFICANT CHANGE UP
PLAT MORPH BLD: NORMAL — SIGNIFICANT CHANGE UP
PLATELET # BLD AUTO: 384 K/UL — SIGNIFICANT CHANGE UP (ref 150–400)
PLATELET # BLD AUTO: 384 K/UL — SIGNIFICANT CHANGE UP (ref 150–400)
POTASSIUM SERPL-MCNC: 4.4 MMOL/L — SIGNIFICANT CHANGE UP (ref 3.5–5.3)
POTASSIUM SERPL-MCNC: 4.4 MMOL/L — SIGNIFICANT CHANGE UP (ref 3.5–5.3)
POTASSIUM SERPL-SCNC: 4.4 MMOL/L — SIGNIFICANT CHANGE UP (ref 3.5–5.3)
POTASSIUM SERPL-SCNC: 4.4 MMOL/L — SIGNIFICANT CHANGE UP (ref 3.5–5.3)
PROT SERPL-MCNC: 6.3 G/DL — SIGNIFICANT CHANGE UP (ref 6–8.3)
PROT SERPL-MCNC: 6.3 G/DL — SIGNIFICANT CHANGE UP (ref 6–8.3)
RBC # BLD: 4.05 M/UL — SIGNIFICANT CHANGE UP (ref 3.8–5.2)
RBC # BLD: 4.05 M/UL — SIGNIFICANT CHANGE UP (ref 3.8–5.2)
RBC # FLD: 14.4 % — SIGNIFICANT CHANGE UP (ref 10.3–14.5)
RBC # FLD: 14.4 % — SIGNIFICANT CHANGE UP (ref 10.3–14.5)
RBC BLD AUTO: ABNORMAL
RBC BLD AUTO: ABNORMAL
SMUDGE CELLS # BLD: PRESENT — SIGNIFICANT CHANGE UP
SMUDGE CELLS # BLD: PRESENT — SIGNIFICANT CHANGE UP
SODIUM SERPL-SCNC: 138 MMOL/L — SIGNIFICANT CHANGE UP (ref 135–145)
SODIUM SERPL-SCNC: 138 MMOL/L — SIGNIFICANT CHANGE UP (ref 135–145)
WBC # BLD: 17.52 K/UL — HIGH (ref 3.8–10.5)
WBC # BLD: 17.52 K/UL — HIGH (ref 3.8–10.5)
WBC # FLD AUTO: 17.52 K/UL — HIGH (ref 3.8–10.5)
WBC # FLD AUTO: 17.52 K/UL — HIGH (ref 3.8–10.5)

## 2023-12-08 PROCEDURE — 99232 SBSQ HOSP IP/OBS MODERATE 35: CPT

## 2023-12-08 PROCEDURE — 99233 SBSQ HOSP IP/OBS HIGH 50: CPT | Mod: GC

## 2023-12-08 RX ORDER — TERIPARATIDE 250 UG/ML
20 INJECTION, SOLUTION SUBCUTANEOUS
Refills: 0 | DISCHARGE

## 2023-12-08 RX ORDER — MAGNESIUM SULFATE 500 MG/ML
1 VIAL (ML) INJECTION ONCE
Refills: 0 | Status: COMPLETED | OUTPATIENT
Start: 2023-12-08 | End: 2023-12-08

## 2023-12-08 RX ADMIN — PANTOPRAZOLE SODIUM 40 MILLIGRAM(S): 20 TABLET, DELAYED RELEASE ORAL at 06:45

## 2023-12-08 RX ADMIN — Medication 500 MICROGRAM(S): at 02:05

## 2023-12-08 RX ADMIN — Medication 500 MICROGRAM(S): at 13:54

## 2023-12-08 RX ADMIN — Medication 100 GRAM(S): at 10:40

## 2023-12-08 RX ADMIN — LEVALBUTEROL 1.25 MILLIGRAM(S): 1.25 SOLUTION, CONCENTRATE RESPIRATORY (INHALATION) at 17:24

## 2023-12-08 RX ADMIN — LEVALBUTEROL 1.25 MILLIGRAM(S): 1.25 SOLUTION, CONCENTRATE RESPIRATORY (INHALATION) at 21:41

## 2023-12-08 RX ADMIN — Medication 500 MICROGRAM(S): at 21:41

## 2023-12-08 RX ADMIN — LEVALBUTEROL 1.25 MILLIGRAM(S): 1.25 SOLUTION, CONCENTRATE RESPIRATORY (INHALATION) at 02:05

## 2023-12-08 RX ADMIN — Medication 40 MILLIGRAM(S): at 07:47

## 2023-12-08 RX ADMIN — Medication 500 MICROGRAM(S): at 10:40

## 2023-12-08 RX ADMIN — CEFTRIAXONE 100 MILLIGRAM(S): 500 INJECTION, POWDER, FOR SOLUTION INTRAMUSCULAR; INTRAVENOUS at 17:24

## 2023-12-08 RX ADMIN — Medication 500 MICROGRAM(S): at 17:25

## 2023-12-08 RX ADMIN — LEVALBUTEROL 1.25 MILLIGRAM(S): 1.25 SOLUTION, CONCENTRATE RESPIRATORY (INHALATION) at 10:40

## 2023-12-08 RX ADMIN — LEVALBUTEROL 1.25 MILLIGRAM(S): 1.25 SOLUTION, CONCENTRATE RESPIRATORY (INHALATION) at 06:46

## 2023-12-08 RX ADMIN — LEVALBUTEROL 1.25 MILLIGRAM(S): 1.25 SOLUTION, CONCENTRATE RESPIRATORY (INHALATION) at 13:54

## 2023-12-08 RX ADMIN — Medication 500 MICROGRAM(S): at 06:46

## 2023-12-08 NOTE — DISCHARGE NOTE PROVIDER - NSDCFUSCHEDAPPT_GEN_ALL_CORE_FT
[facility]  [address]  Scheduled Appointment: 12/19/2023     [facility]  [address]  Scheduled Appointment: 12/19/2023

## 2023-12-08 NOTE — DISCHARGE NOTE PROVIDER - NSDCMRMEDTOKEN_GEN_ALL_CORE_FT
atorvastatin 20 mg oral tablet: 1 tab(s) orally once a day (at bedtime)  diphenoxylate-atropine 2.5 mg-0.025 mg oral tablet: 2 tab(s) orally 2 times a day  Forteo 600 mcg/2.4 mL subcutaneous solution: 20 microgram(s) subcutaneously once a day BRAND is medically necessary   atorvastatin 20 mg oral tablet: 1 tab(s) orally once a day (at bedtime)  cefpodoxime 200 mg oral tablet: 1 tab(s) orally every 12 hours  diphenoxylate-atropine 2.5 mg-0.025 mg oral tablet: 2 tab(s) orally 2 times a day  Forteo 600 mcg/2.4 mL subcutaneous solution: 20 microgram(s) subcutaneously once a day BRAND is medically necessary

## 2023-12-08 NOTE — CONSULT NOTE ADULT - ATTENDING COMMENTS
copd exacerbation in the setting of RSV infection with superimposed bacterial infection. feels better on prednisone and abx. plan as above. will need to f/u in pulmonary clinic on dc.

## 2023-12-08 NOTE — PROGRESS NOTE ADULT - ASSESSMENT
74 y/o f hx emphysema, HLD, osteoporosis presents c/o cough, congestion, sore throat x 1 week and SOB for 2 days, found to be in COPD exacerbation 2/2 RSV infection with superimposed CAP. Patient with sepsis secondary to RSV pneumonia and possibly CAP, with acute hypoxemic respiratory failure secondary to RSV pneumonia and possibly CAP with COPD exacerbation all present on admission.

## 2023-12-08 NOTE — PROGRESS NOTE ADULT - SUBJECTIVE AND OBJECTIVE BOX
SUBJECTIVE/OVERNIGHT EVENTS: No acute overnight events. Pt seen in AM at bedside, resting comfortably in bed, and does not appear to be in any acute distress. When asked, pt denies any recent or active fever, chills, nausea, vomiting, headache, acute sob, chest pain, abdominal pain, genitourinary sx, extremity pain or swelling.    VITAL SIGNS:  Vital Signs Last 24 Hrs  T(C): 36.6 (08 Dec 2023 09:44), Max: 36.8 (07 Dec 2023 14:15)  T(F): 97.8 (08 Dec 2023 09:44), Max: 98.2 (07 Dec 2023 14:15)  HR: 99 (08 Dec 2023 09:44) (98 - 104)  BP: 114/75 (08 Dec 2023 09:44) (114/75 - 129/66)  BP(mean): 88 (08 Dec 2023 09:44) (88 - 90)  RR: 19 (08 Dec 2023 09:44) (19 - 27)  SpO2: 93% (08 Dec 2023 09:44) (84% - 94%)    Parameters below as of 08 Dec 2023 09:44  Patient On (Oxygen Delivery Method): nasal cannula  O2 Flow (L/min): 2    PHYSICAL EXAM:  General: in no acute distress  Eyes: EOMI intact bilaterally, no thrush noted, pupils 6mm bilateral and briskly reactive to light  HENT: Moist mucous membranes  Neck: Trachea midline  Lungs: CTA B/L. No wheezes  Cardiovascular: RRR  Abdomen: Firm, nontender, non tympanic  Extremities: WWP, No cyanosis  Neurological: Alert and oriented, isolated left facial nerve palsy with facial twitch  Skin: Warm and dry, skin     MEDICATIONS:  MEDICATIONS  (STANDING):  budesonide 160 MICROgram(s)/formoterol 4.5 MICROgram(s) Inhaler 2 Puff(s) Inhalation two times a day  cefTRIAXone   IVPB 2000 milliGRAM(s) IV Intermittent every 24 hours  dextrose 5%. 1000 milliLiter(s) (50 mL/Hr) IV Continuous <Continuous>  dextrose 5%. 1000 milliLiter(s) (100 mL/Hr) IV Continuous <Continuous>  dextrose 50% Injectable 25 Gram(s) IV Push once  dextrose 50% Injectable 25 Gram(s) IV Push once  dextrose 50% Injectable 12.5 Gram(s) IV Push once  enoxaparin Injectable 40 milliGRAM(s) SubCutaneous every 24 hours  glucagon  Injectable 1 milliGRAM(s) IntraMuscular once  insulin lispro (ADMELOG) corrective regimen sliding scale   SubCutaneous three times a day before meals  ipratropium    for Nebulization 500 MICROGram(s) Nebulizer every 4 hours  levalbuterol Inhalation 1.25 milliGRAM(s) Inhalation every 4 hours  pantoprazole    Tablet 40 milliGRAM(s) Oral before breakfast  predniSONE   Tablet 40 milliGRAM(s) Oral every 24 hours    MEDICATIONS  (PRN):  acetaminophen     Tablet .. 975 milliGRAM(s) Oral every 6 hours PRN Temp greater or equal to 38C (100.4F), Mild Pain (1 - 3)  dextrose Oral Gel 15 Gram(s) Oral once PRN Blood Glucose LESS THAN 70 milliGRAM(s)/deciliter  sodium chloride 0.65% Nasal 1 Spray(s) Both Nostrils three times a day PRN Nasal Congestion      ALLERGIES:  Allergies    No Known Allergies    Intolerances        LABS:                        11.6   17.52 )-----------( 384      ( 08 Dec 2023 05:30 )             36.4     12-08    138  |  98  |  26<H>  ----------------------------<  87  4.4   |  29  |  0.64    Ca    9.3      08 Dec 2023 05:30  Phos  4.4     12-08  Mg     1.9     12-08    TPro  6.3  /  Alb  2.8<L>  /  TBili  0.2  /  DBili  x   /  AST  56<H>  /  ALT  90<H>  /  AlkPhos  88  12-08        RADIOLOGY & ADDITIONAL TESTS: Reviewed.

## 2023-12-08 NOTE — DISCHARGE NOTE PROVIDER - CARE PROVIDERS DIRECT ADDRESSES
[email or web address]

## 2023-12-08 NOTE — DISCHARGE NOTE PROVIDER - HOSPITAL COURSE
Hospital course by problem  ROBBIE LUDWIG is a 72 y/o f hx emphysema, HLD, osteoporosis presents c/o cough, congestion, sore throat x 1 week and SOB for 2 days, found to be in COPD exacerbation 2/2 RSV infection with superimposed CAP. Patient with sepsis secondary to RSV pneumonia and possibly CAP, with acute hypoxemic respiratory failure secondary to RSV pneumonia and possibly CAP with COPD exacerbation all present on admission.      #Sepsis   On admission achy to 124, leukocytosis to 14.79, with viral and bacterial pneumonia source all present on admission. Received 1L ns bolus in ED. Also RSV+  CXR: Suspected emphysema. Subtle opacities left basal. Findings can be correlated with CT chest.  Procal 1.17. ceftriaxone 2g q24hrs x 5 days (12/5-12/9), azithromycin 500mg q24hrs x 5 days (12/5-12/9). Leukocytosis downtrended.    -Outpt follow-up    #COPD exacerbation.  #Acute hypoxic resperatory failure   Pt with known severe emphysema, asymptomatic at baseline not on maintenance inhalers. Follows with Dr. Zapien. Baseline O2 >95%. RSV positive in ED, likely trigger for COPD exacerbation. Not on home o2. Pulm consulted. received prednisone 40mg daily x 5 days (12/5-12/9), Xopenex/ipratropium nebs standing q4h. Clinically improved Weaned of O2 to room air.    - outpt pulmonology    #HLD (hyperlipidemia).   Patient with a known hx of HLD.  - c/w home med atorvastatin 20mg PO qd.      Patient was discharged to: (home/[facility]/acute rehab/hospice, etc. and w/ home health/home PT/RN/home O2)    New medications:   Changes to old medications:  Medications that were stopped:  New Hardware:  New DME:    Items to follow up as outpatient:    Discharge physical exam:     Hospital course by problem  ROBBIE LUDWIG is a 74 y/o f hx emphysema, HLD, osteoporosis presents c/o cough, congestion, sore throat x 1 week and SOB for 2 days, found to be in COPD exacerbation 2/2 RSV infection with superimposed CAP. Patient with sepsis secondary to RSV pneumonia and possibly CAP, with acute hypoxemic respiratory failure secondary to RSV pneumonia and possibly CAP with COPD exacerbation all present on admission.      #Sepsis   On admission achy to 124, leukocytosis to 14.79, with viral and bacterial pneumonia source all present on admission. Received 1L ns bolus in ED. Also RSV+  CXR: Suspected emphysema. Subtle opacities left basal. Findings can be correlated with CT chest.  Procal 1.17. ceftriaxone 2g q24hrs x 5 days (12/5-12/9), azithromycin 500mg q24hrs x 5 days (12/5-12/9). Leukocytosis downtrended.    -Outpt follow-up    #COPD exacerbation.  #Acute hypoxic resperatory failure   Pt with known severe emphysema, asymptomatic at baseline not on maintenance inhalers. Follows with Dr. Zapien. Baseline O2 >95%. RSV positive in ED, likely trigger for COPD exacerbation. Not on home o2. Pulm consulted. received prednisone 40mg daily x 5 days (12/5-12/9), Xopenex/ipratropium nebs standing q4h. Clinically improved Weaned of O2 to room air.    - outpt pulmonology    #HLD (hyperlipidemia).   Patient with a known hx of HLD.  - c/w home med atorvastatin 20mg PO qd.      Patient was discharged to: (home/[facility]/acute rehab/hospice, etc. and w/ home health/home PT/RN/home O2)    New medications:   Changes to old medications:  Medications that were stopped:  New Hardware:  New DME:    Items to follow up as outpatient:    Discharge physical exam:     Hospital course by problem  ROBBIE LUDWIG is a 74 y/o f hx emphysema, HLD, osteoporosis presents c/o cough, congestion, sore throat x 1 week and SOB for 2 days, found to be in COPD exacerbation 2/2 RSV infection with superimposed CAP. Patient with sepsis secondary to RSV pneumonia and possibly CAP, with acute hypoxemic respiratory failure secondary to RSV pneumonia and possibly CAP with COPD exacerbation all present on admission.      #Sepsis   On admission achy to 124, leukocytosis to 14.79, with viral and bacterial pneumonia source all present on admission. Received 1L ns bolus in ED. Also RSV+  CXR: Suspected emphysema. Subtle opacities left basal. Findings can be correlated with CT chest.  Procal 1.17. ceftriaxone 2g q24hrs x 5 days (12/5-12/9), azithromycin 500mg q24hrs x 5 days (12/5-12/9). Leukocytosis downtrended.    -Outpt follow-up    #COPD exacerbation.  #Acute hypoxic respiratory failure   Pt with known severe emphysema, asymptomatic at baseline not on maintenance inhalers. Follows with Dr. Zapien. Baseline O2 >95%. RSV positive in ED, likely trigger for COPD exacerbation. Not on home o2. Pulm consulted. received prednisone 40mg daily x 5 days (12/5-12/9), Xopenex/ipratropium nebs standing q4h. Clinically improved Weaned of O2 to room air.    - outpt pulmonology with Dr Zapien    #HLD (hyperlipidemia).   Patient with a known hx of HLD.  - c/w home med atorvastatin 20mg PO qd.      Patient was discharged to: home    New medications: None  Changes to old medications: None  Medications that were stopped: None    Items to follow up as outpatient: Pulmonology, PCP    Discharge physical exam:  General: in no acute distress  Eyes: EOMI intact bilaterally, no thrush noted, pupils 6mm bilateral and briskly reactive to light  HENT: Moist mucous membranes  Neck: Trachea midline  Lungs: CTA B/L. No wheezes  Cardiovascular: RRR  Abdomen: Firm, nontender, non tympanic  Extremities: WWP, No cyanosis  Neurological: Alert and oriented, isolated left facial nerve palsy with facial twitch  Skin: Warm and dry, skin    Hospital course by problem  ROBBIE LUDWIG is a 72 y/o f hx emphysema, HLD, osteoporosis presents c/o cough, congestion, sore throat x 1 week and SOB for 2 days, found to be in COPD exacerbation 2/2 RSV infection with superimposed CAP. Patient with sepsis secondary to RSV pneumonia and possibly CAP, with acute hypoxemic respiratory failure secondary to RSV pneumonia and possibly CAP with COPD exacerbation all present on admission.      #Sepsis   On admission achy to 124, leukocytosis to 14.79, with viral and bacterial pneumonia source all present on admission. Received 1L ns bolus in ED. Also RSV+  CXR: Suspected emphysema. Subtle opacities left basal. Findings can be correlated with CT chest.  Procal 1.17. ceftriaxone 2g q24hrs x 5 days (12/5-12/9), azithromycin 500mg q24hrs x 5 days (12/5-12/9). Leukocytosis downtrended.    -Outpt follow-up    #COPD exacerbation.  #Acute hypoxic respiratory failure   Pt with known severe emphysema, asymptomatic at baseline not on maintenance inhalers. Follows with Dr. Zapien. Baseline O2 >95%. RSV positive in ED, likely trigger for COPD exacerbation. Not on home o2. Pulm consulted. received prednisone 40mg daily x 5 days (12/5-12/9), Xopenex/ipratropium nebs standing q4h. Clinically improved Weaned of O2 to room air.    - outpt pulmonology with Dr Zapien    #HLD (hyperlipidemia).   Patient with a known hx of HLD.  - c/w home med atorvastatin 20mg PO qd.      Patient was discharged to: home    New medications: None  Changes to old medications: None  Medications that were stopped: None    Items to follow up as outpatient: Pulmonology, PCP    Discharge physical exam:  General: in no acute distress  Eyes: EOMI intact bilaterally, no thrush noted, pupils 6mm bilateral and briskly reactive to light  HENT: Moist mucous membranes  Neck: Trachea midline  Lungs: CTA B/L. No wheezes  Cardiovascular: RRR  Abdomen: Firm, nontender, non tympanic  Extremities: WWP, No cyanosis  Neurological: Alert and oriented, isolated left facial nerve palsy with facial twitch  Skin: Warm and dry, skin    Hospital course by problem  ROBBIE LUDWIG is a 72 y/o f hx emphysema, HLD, osteoporosis presents c/o cough, congestion, sore throat x 1 week and SOB for 2 days, found to be in COPD exacerbation 2/2 RSV infection with superimposed CAP. Patient with sepsis secondary to RSV pneumonia and possibly CAP, with acute hypoxemic respiratory failure secondary to RSV pneumonia and possibly CAP with COPD exacerbation all present on admission.      #Sepsis   On admission tachy to 124, leukocytosis to 14.79, with viral and bacterial pneumonia source all present on admission. Received 1L ns bolus in ED. Also RSV+  CXR: Suspected emphysema. Subtle opacities left basal. Findings can be correlated with CT chest.  Procal 1.17. ceftriaxone 2g q24hrs x 5 days (12/5-12/9), azithromycin 500mg q24hrs x 5 days (12/5-12/9). Leukocytosis downtrended.    -Outpt follow-up    #COPD exacerbation.  #Acute hypoxic respiratory failure   Pt with known severe emphysema, asymptomatic at baseline not on maintenance inhalers. Follows with Dr. Zapien. Baseline O2 >95%. RSV positive in ED, likely trigger for COPD exacerbation. Not on home o2. Pulm consulted. received prednisone 40mg daily x 5 days (12/5-12/9), Xopenex/ipratropium nebs standing q4h. Clinically improved Weaned of O2 to room air.    - outpt pulmonology with Dr Zapien    #HLD (hyperlipidemia).   Patient with a known hx of HLD.  - c/w home med atorvastatin 20mg PO qd.      Patient was discharged to: home    New medications: None  Changes to old medications: None  Medications that were stopped: None    Items to follow up as outpatient: Pulmonology, PCP    Discharge physical exam:  General: in no acute distress  Eyes: EOMI intact bilaterally, no thrush noted, pupils 6mm bilateral and briskly reactive to light  HENT: Moist mucous membranes  Neck: Trachea midline  Lungs: CTA B/L. No wheezes  Cardiovascular: RRR  Abdomen: Firm, nontender, non tympanic  Extremities: WWP, No cyanosis  Neurological: Alert and oriented, isolated left facial nerve palsy with facial twitch  Skin: Warm and dry, skin    Hospital course by problem  ROBBIE LUDWIG is a 74 y/o f hx emphysema, HLD, osteoporosis presents c/o cough, congestion, sore throat x 1 week and SOB for 2 days, found to be in COPD exacerbation 2/2 RSV infection with superimposed CAP. Patient with sepsis secondary to RSV pneumonia and possibly CAP, with acute hypoxemic respiratory failure secondary to RSV pneumonia and possibly CAP with COPD exacerbation all present on admission.      #Sepsis   On admission tachy to 124, leukocytosis to 14.79, with viral and bacterial pneumonia source all present on admission. Received 1L ns bolus in ED. Also RSV+  CXR: Suspected emphysema. Subtle opacities left basal. Findings can be correlated with CT chest.  Procal 1.17. ceftriaxone 2g q24hrs x 5 days (12/5-12/9), azithromycin 500mg q24hrs x 5 days (12/5-12/9). Leukocytosis downtrended.    -Outpt follow-up    #COPD exacerbation.  #Acute hypoxic respiratory failure   Pt with known severe emphysema, asymptomatic at baseline not on maintenance inhalers. Follows with Dr. Zapien. Baseline O2 >95%. RSV positive in ED, likely trigger for COPD exacerbation. Not on home o2. Pulm consulted. received prednisone 40mg daily x 5 days (12/5-12/9), Xopenex/ipratropium nebs standing q4h. Clinically improved Weaned of O2 to room air.    - outpt pulmonology with Dr Zapien    #HLD (hyperlipidemia).   Patient with a known hx of HLD.  - c/w home med atorvastatin 20mg PO qd.      Patient was discharged to: home    New medications: None  Changes to old medications: None  Medications that were stopped: None    Items to follow up as outpatient: Pulmonology, PCP    Discharge physical exam:  General: in no acute distress  Eyes: EOMI intact bilaterally, no thrush noted, pupils 6mm bilateral and briskly reactive to light  HENT: Moist mucous membranes  Neck: Trachea midline  Lungs: CTA B/L. No wheezes  Cardiovascular: RRR  Abdomen: Firm, nontender, non tympanic  Extremities: WWP, No cyanosis  Neurological: Alert and oriented, isolated left facial nerve palsy with facial twitch  Skin: Warm and dry, skin

## 2023-12-08 NOTE — DISCHARGE NOTE PROVIDER - DETAILS OF MALNUTRITION DIAGNOSIS/DIAGNOSES
This patient has been assessed with a concern for Malnutrition and was treated during this hospitalization for the following Nutrition diagnosis/diagnoses:     -  12/05/2023: Severe protein-calorie malnutrition   -  12/05/2023: Underweight (BMI < 19)

## 2023-12-08 NOTE — DISCHARGE NOTE PROVIDER - NSDCCPCAREPLAN_GEN_ALL_CORE_FT
PRINCIPAL DISCHARGE DIAGNOSIS  Diagnosis: COPD exacerbation  Assessment and Plan of Treatment: Chronic obstructive pulmonary disease (COPD) is a chronic inflammatory lung disease that causes obstructed airflow from the lungs. Symptoms include breathing difficulty, cough, mucus (sputum) production and wheezing. It's typically caused by long-term exposure to irritating gases or particulate matter, most often from cigarette smoke. People with COPD are at increased risk of developing heart disease, lung cancer and a variety of other conditions.  Emphysema and chronic bronchitis are the two most common conditions that contribute to COPD. These two conditions usually occur together and can vary in severity among individuals with COPD.  Chronic bronchitis is inflammation of the lining of the bronchial tubes, which carry air to and from the air sacs (alveoli) of the lungs. It's characterized by daily cough and mucus (sputum) production.  Emphysema is a condition in which the alveoli at the end of the smallest air passages (bronchioles) of the lungs are destroyed as a result of damaging exposure to cigarette smoke and other irritating gases and particulate matter.  Although COPD is a progressive disease that gets worse over time, COPD is treatable. With proper management, most people with COPD can achieve good symptom control and quality of life, as well as reduced risk of other associated conditions.     PRINCIPAL DISCHARGE DIAGNOSIS  Diagnosis: COPD exacerbation  Assessment and Plan of Treatment: Chronic obstructive pulmonary disease (COPD) is a chronic inflammatory lung disease that causes obstructed airflow from the lungs. Symptoms include breathing difficulty, cough, mucus (sputum) production and wheezing. It's typically caused by long-term exposure to irritating gases or particulate matter, most often from cigarette smoke.    With proper management, most people with COPD can achieve good symptom control and quality of life, as well as reduced risk of other associated conditions. Your exacerbation was caused by a virus called RSV and community acquired pneumonia. In the hospital, you received a course of prednisone (40mg x5 days), and you completed a course of antibiotics (azithromycin and ceftriaxone/cefpodoxime). You also received inhalers (symbicort and duonebs) to help with your breathing. Your oxygen saturations improved during your hospital course with treatment.  ------------------------------  DISCHARGE INSTRUCTIONS  1. Please follow up with your pulmonologist Dr Zapien within 2 weeks of discharge  2. Please return to the ED if your breathing worsens      SECONDARY DISCHARGE DIAGNOSES  Diagnosis: CAP (community acquired pneumonia)  Assessment and Plan of Treatment: Received 5 day antibiotic course with ceftriaxone and azithromycin.  - please return to ED if your symptoms worsen  - follow up with your pulmonologist and PCP within 2 weeks of discharge

## 2023-12-08 NOTE — PROGRESS NOTE ADULT - ATTENDING COMMENTS
73F with PMH of emphysema, HLD and osteoporosis presenting with dyspnea, cough, congestion and sore throat, admitted with COPD exacerbation and RSV positive. 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      35 minutes spent on this encounter, including face to face with patient, care coordination and documentation.
73F with PMH of emphysema, HLD and osteoporosis presenting with dyspnea, cough, congestion and sore throat, admitted with COPD exacerbation and RSV positive. 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      35 minutes spent on this encounter, including face to face with patient, care coordination and documentation.
h/o COPD with acute hypoxemic respiratory failure secondary to RSV and possibly CAP  physical as above  I have reviewed th CXR and there is bibasilar haziness in lung fields  manipulating HFNC and NC to achieve comfort and ease work of breathing  continue prednisone; she is getting hyperglycemic so will continue monitoring FS and will need insulin  continue bronchodilators  we will check procalcitonin; if low will consider stopping the antibiotics  will continue on telemetry as she is at risk for further deterioration requiring escalation to MICU  decision making of high complexity
Patient with sepsis secondary to RSV pneumonia and possibly CAP, with acute hypoxemic respiratory failure secondary to RSV pneumonia and possibly CAP with COPD exacerbation all present on admission  physical as above  procalcitonin high so will continue ceftriaxone and zithromax  her work of breathing with exertion is still high so continue telemetry  bronchodilators and prednisone  rest as above
COPD with RSV sepsis and pna and likely superimposed CAP with acute hypoxemic respiratory failure  physical as above  looks much better re breathing  continue ceftriaxone and zithromax 7 days at least  supplemental NC   prednisone and bronchodilators  mobilize

## 2023-12-08 NOTE — DISCHARGE NOTE PROVIDER - NSDCFUADDAPPT_GEN_ALL_CORE_FT
We contacted Dr. Zapien's office to let them know that you are being discharged from the hospital. They will reach out to you to schedule a follow-up appointment.

## 2023-12-08 NOTE — PROGRESS NOTE ADULT - PROBLEM SELECTOR PROBLEM 6
HLD (hyperlipidemia)
Prophylactic measure
HLD (hyperlipidemia)
Prophylactic measure
Prophylactic measure

## 2023-12-08 NOTE — PROGRESS NOTE ADULT - PROBLEM SELECTOR PROBLEM 3
Acute hypoxic respiratory failure
RSV infection
RSV infection
Acute hypoxic respiratory failure
Acute hypoxic respiratory failure

## 2023-12-08 NOTE — CONSULT NOTE ADULT - SUBJECTIVE AND OBJECTIVE BOX
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 She has also been feeling feverish at home but has not taken her temp at home. At baseline, the patient states that she is able to walk without stopping to catch her breath, and is able to climb a flight of stairs without difficulty. She does not take any inhalers for her emphysema. Follows with Dr. Zapien in pulm. Has 30+ pack year smoking history, quit in 2014. Pt denies chest pain, nausea, vomiting, diarrhea, abdominal pain, constipation, extremity swelling. Pulmonary consulted for COPD exacerbation 2/2 to RSV infection complicated by CAP.       REVIEW OF SYSTEMS:  Negative othwerwise noted in above HPI    PAST MEDICAL & SURGICAL HISTORY:  H/O emphysema      HLD (hyperlipidemia)      Parotid tumor  resection          FAMILY HISTORY:      SOCIAL HISTORY:  Smoking Status: [ ] Current, [ ] Former, [ ] Never  Pack Years:    MEDICATIONS:  Pulmonary:  budesonide 160 MICROgram(s)/formoterol 4.5 MICROgram(s) Inhaler 2 Puff(s) Inhalation two times a day  ipratropium    for Nebulization 500 MICROGram(s) Nebulizer every 4 hours  levalbuterol Inhalation 1.25 milliGRAM(s) Inhalation every 4 hours    Antimicrobials:  cefTRIAXone   IVPB 2000 milliGRAM(s) IV Intermittent every 24 hours    Anticoagulants:  enoxaparin Injectable 40 milliGRAM(s) SubCutaneous every 24 hours    Onc:    GI/:  pantoprazole    Tablet 40 milliGRAM(s) Oral before breakfast    Endocrine:  dextrose 50% Injectable 25 Gram(s) IV Push once  dextrose 50% Injectable 25 Gram(s) IV Push once  dextrose 50% Injectable 12.5 Gram(s) IV Push once  dextrose Oral Gel 15 Gram(s) Oral once PRN  glucagon  Injectable 1 milliGRAM(s) IntraMuscular once  insulin lispro (ADMELOG) corrective regimen sliding scale   SubCutaneous three times a day before meals  predniSONE   Tablet 40 milliGRAM(s) Oral every 24 hours    Cardiac:    Other Medications:  acetaminophen     Tablet .. 975 milliGRAM(s) Oral every 6 hours PRN  dextrose 5%. 1000 milliLiter(s) IV Continuous <Continuous>  dextrose 5%. 1000 milliLiter(s) IV Continuous <Continuous>  sodium chloride 0.65% Nasal 1 Spray(s) Both Nostrils three times a day PRN      Allergies    No Known Allergies    Intolerances        Vital Signs Last 24 Hrs  T(C): 36.6 (08 Dec 2023 09:44), Max: 36.8 (07 Dec 2023 14:15)  T(F): 97.8 (08 Dec 2023 09:44), Max: 98.2 (07 Dec 2023 14:15)  HR: 99 (08 Dec 2023 09:44) (98 - 104)  BP: 114/75 (08 Dec 2023 09:44) (114/75 - 129/66)  BP(mean): 88 (08 Dec 2023 09:44) (88 - 90)  RR: 19 (08 Dec 2023 09:44) (19 - 27)  SpO2: 93% (08 Dec 2023 09:44) (84% - 94%)    Parameters below as of 08 Dec 2023 09:44  Patient On (Oxygen Delivery Method): nasal cannula  O2 Flow (L/min): 2      12-07 @ 07:01  -  12-08 @ 07:00  --------------------------------------------------------  IN: 694 mL / OUT: 0 mL / NET: 694 mL      PHYSICAL EXAM:  Constitutional: WD  Head: NC/AT  EENT: anicteric sclera; oropharynx clear, MMM  Neck: supple, no JVD  Respiratory: CTA B/L; no W/R/R  Cardiovascular: +S1/S2, RRR  Gastrointestinal: soft, NT/ND  Extremities: WWP; no clubbing or cyanosis; no edema  Vascular: 2+ radial and pedal pulses  Neurological: alert, oriented    LABS:      CBC Full  -  ( 08 Dec 2023 05:30 )  WBC Count : 17.52 K/uL  RBC Count : 4.05 M/uL  Hemoglobin : 11.6 g/dL  Hematocrit : 36.4 %  Platelet Count - Automated : 384 K/uL  Mean Cell Volume : 89.9 fl  Mean Cell Hemoglobin : 28.6 pg  Mean Cell Hemoglobin Concentration : 31.9 gm/dL  Auto Neutrophil # : 13.68 K/uL  Auto Lymphocyte # : 2.61 K/uL  Auto Monocyte # : 1.07 K/uL  Auto Eosinophil # : 0.16 K/uL  Auto Basophil # : 0.00 K/uL  Auto Neutrophil % : 77.2 %  Auto Lymphocyte % : 14.9 %  Auto Monocyte % : 6.1 %  Auto Eosinophil % : 0.9 %  Auto Basophil % : 0.0 %    12-08    138  |  98  |  26<H>  ----------------------------<  87  4.4   |  29  |  0.64    Ca    9.3      08 Dec 2023 05:30  Phos  4.4     12-08  Mg     1.9     12-08    TPro  6.3  /  Alb  2.8<L>  /  TBili  0.2  /  DBili  x   /  AST  56<H>  /  ALT  90<H>  /  AlkPhos  88  12-08          Urinalysis Basic - ( 08 Dec 2023 05:30 )    Color: x / Appearance: x / SG: x / pH: x  Gluc: 87 mg/dL / Ketone: x  / Bili: x / Urobili: x   Blood: x / Protein: x / Nitrite: x   Leuk Esterase: x / RBC: x / WBC x   Sq Epi: x / Non Sq Epi: x / Bacteria: x    RADIOLOGY & ADDITIONAL STUDIES: Personally reviewed.    < from: Xray Chest 1 View- PORTABLE-Urgent (Xray Chest 1 View- PORTABLE-Urgent .) (12.04.23 @ 15:48) >  ACC: [id number] EXAM:  XR CHEST PORTABLE URGENT 1V   ORDERED BY: EMIL MCCARTHY     PROCEDURE DATE:  12/04/2023          INTERPRETATION:  PORTABLE CHEST X-RAY    HISTORY: cough    PRIOR STUDIES: 3/1/2007    FINDINGS: No cardiomegaly. No pleuraleffusion. No pneumothorax. The   lungs are hyperinflated suspected  emphysema. Subtle groundglass   opacities left basal/left retrocardiac probably in the left lower lobe.   Apparent 25 mm thin-walled air filled bulla/cyst in right upper lobe.    IMPRESSION:  Suspected emphysema. Subtle opacities left basal. Findings   can be correlated with CT chest.    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 She has also been feeling feverish at home but has not taken her temp at home. At baseline, the patient states that she is able to walk without stopping to catch her breath, and is able to climb a flight of stairs without difficulty. She does not take any inhalers for her emphysema. Follows with Dr. Zapien in pulm. Has 30+ pack year smoking history, quit in 2014. Pt denies chest pain, nausea, vomiting, diarrhea, abdominal pain, constipation, extremity swelling. Pulmonary consulted for COPD exacerbation 2/2 to RSV infection complicated by CAP.       REVIEW OF SYSTEMS:  Negative othwerwise noted in above HPI    PAST MEDICAL & SURGICAL HISTORY:  H/O emphysema      HLD (hyperlipidemia)      Parotid tumor  resection          FAMILY HISTORY:      SOCIAL HISTORY:  Smoking Status: [ ] Current, [ ] Former, [ ] Never  Pack Years:    MEDICATIONS:  Pulmonary:  budesonide 160 MICROgram(s)/formoterol 4.5 MICROgram(s) Inhaler 2 Puff(s) Inhalation two times a day  ipratropium    for Nebulization 500 MICROGram(s) Nebulizer every 4 hours  levalbuterol Inhalation 1.25 milliGRAM(s) Inhalation every 4 hours    Antimicrobials:  cefTRIAXone   IVPB 2000 milliGRAM(s) IV Intermittent every 24 hours    Anticoagulants:  enoxaparin Injectable 40 milliGRAM(s) SubCutaneous every 24 hours    Onc:    GI/:  pantoprazole    Tablet 40 milliGRAM(s) Oral before breakfast    Endocrine:  dextrose 50% Injectable 25 Gram(s) IV Push once  dextrose 50% Injectable 25 Gram(s) IV Push once  dextrose 50% Injectable 12.5 Gram(s) IV Push once  dextrose Oral Gel 15 Gram(s) Oral once PRN  glucagon  Injectable 1 milliGRAM(s) IntraMuscular once  insulin lispro (ADMELOG) corrective regimen sliding scale   SubCutaneous three times a day before meals  predniSONE   Tablet 40 milliGRAM(s) Oral every 24 hours    Cardiac:    Other Medications:  acetaminophen     Tablet .. 975 milliGRAM(s) Oral every 6 hours PRN  dextrose 5%. 1000 milliLiter(s) IV Continuous <Continuous>  dextrose 5%. 1000 milliLiter(s) IV Continuous <Continuous>  sodium chloride 0.65% Nasal 1 Spray(s) Both Nostrils three times a day PRN      Allergies    No Known Allergies    Intolerances        Vital Signs Last 24 Hrs  T(C): 36.6 (08 Dec 2023 09:44), Max: 36.8 (07 Dec 2023 14:15)  T(F): 97.8 (08 Dec 2023 09:44), Max: 98.2 (07 Dec 2023 14:15)  HR: 99 (08 Dec 2023 09:44) (98 - 104)  BP: 114/75 (08 Dec 2023 09:44) (114/75 - 129/66)  BP(mean): 88 (08 Dec 2023 09:44) (88 - 90)  RR: 19 (08 Dec 2023 09:44) (19 - 27)  SpO2: 93% (08 Dec 2023 09:44) (84% - 94%)    Parameters below as of 08 Dec 2023 09:44  Patient On (Oxygen Delivery Method): nasal cannula  O2 Flow (L/min): 2      12-07 @ 07:01  -  12-08 @ 07:00  --------------------------------------------------------  IN: 694 mL / OUT: 0 mL / NET: 694 mL      PHYSICAL EXAM:  Constitutional: WD  Head: NC/AT  EENT: anicteric sclera; oropharynx clear, MMM  Neck: supple, no JVD  Respiratory: CTA B/L; no W/R/R  Cardiovascular: +S1/S2, RRR  Gastrointestinal: soft, NT/ND  Extremities: WWP; no clubbing or cyanosis; no edema  Vascular: 2+ radial and pedal pulses  Neurological: alert, oriented    LABS:      CBC Full  -  ( 08 Dec 2023 05:30 )  WBC Count : 17.52 K/uL  RBC Count : 4.05 M/uL  Hemoglobin : 11.6 g/dL  Hematocrit : 36.4 %  Platelet Count - Automated : 384 K/uL  Mean Cell Volume : 89.9 fl  Mean Cell Hemoglobin : 28.6 pg  Mean Cell Hemoglobin Concentration : 31.9 gm/dL  Auto Neutrophil # : 13.68 K/uL  Auto Lymphocyte # : 2.61 K/uL  Auto Monocyte # : 1.07 K/uL  Auto Eosinophil # : 0.16 K/uL  Auto Basophil # : 0.00 K/uL  Auto Neutrophil % : 77.2 %  Auto Lymphocyte % : 14.9 %  Auto Monocyte % : 6.1 %  Auto Eosinophil % : 0.9 %  Auto Basophil % : 0.0 %    12-08    138  |  98  |  26<H>  ----------------------------<  87  4.4   |  29  |  0.64    Ca    9.3      08 Dec 2023 05:30  Phos  4.4     12-08  Mg     1.9     12-08    TPro  6.3  /  Alb  2.8<L>  /  TBili  0.2  /  DBili  x   /  AST  56<H>  /  ALT  90<H>  /  AlkPhos  88  12-08          Urinalysis Basic - ( 08 Dec 2023 05:30 )    Color: x / Appearance: x / SG: x / pH: x  Gluc: 87 mg/dL / Ketone: x  / Bili: x / Urobili: x   Blood: x / Protein: x / Nitrite: x   Leuk Esterase: x / RBC: x / WBC x   Sq Epi: x / Non Sq Epi: x / Bacteria: x    RADIOLOGY & ADDITIONAL STUDIES: Personally reviewed.    < from: Xray Chest 1 View- PORTABLE-Urgent (Xray Chest 1 View- PORTABLE-Urgent .) (12.04.23 @ 15:48) >  ACC: [id number] EXAM:  XR CHEST PORTABLE URGENT 1V   ORDERED BY: EMIL MCCARTHY     PROCEDURE DATE:  12/04/2023          INTERPRETATION:  PORTABLE CHEST X-RAY    HISTORY: cough    PRIOR STUDIES: 3/1/2007    FINDINGS: No cardiomegaly. No pleuraleffusion. No pneumothorax. The   lungs are hyperinflated suspected  emphysema. Subtle groundglass   opacities left basal/left retrocardiac probably in the left lower lobe.   Apparent 25 mm thin-walled air filled bulla/cyst in right upper lobe.    IMPRESSION:  Suspected emphysema. Subtle opacities left basal. Findings   can be correlated with CT chest.    --- End of Report ---      < end of copied text >

## 2023-12-08 NOTE — CONSULT NOTE ADULT - TIME BILLING
-Cr 2.2 on admission. Baseline ~0.7. Likely prerenal azotemia with poor perfusion of the kidneys in setting of anemia and severe hypoalbuminemia (1.4) and thus poor oncotic pressure.   -Avoid nephrotoxic drugs. Hold home ARB, NSAID and Furosemide which likely exacerbated the ELAINE.  -was receiving NS early in admission; and given 1un PRBC on 10/14 and 2nd unit PRBC given on 10/15. Appropriate response in H&H to the second unit PRBC.  -Nephro ([facility]) following, recs appreciated  -ELAINE resolved with BUN/Cr down to 15/0.64 today   -added Faustino and then added Ensure to pt's meal and encouraged pt to improve her po intake and especially the extra protein.  -monitor BMP
-Cr 2.2 on admission. Baseline ~0.7. Likely prerenal azotemia with poor perfusion of the kidneys in setting of anemia and severe hypoalbuminemia (1.4) and thus poor oncotic pressure.   -Avoid nephrotoxic drugs. Hold home ARB, NSAID and Furosemide which likely exacerbated the ELAINE.  -was receiving NS early in admission; and given 1un PRBC on 10/14 and 2nd unit PRBC given on 10/15. Appropriate response in H&H to the second unit PRBC.  -Nephro ([facility] group) following, recs appreciated  -ELAINE resolved with BUN/Cr down to pt's baseline now for several days (~15/0.7)  -added Faustino and then added Ensure to pt's meal and encouraged pt to improve her po intake and especially the extra protein.  -monitor BMP
-Cr 2.2 on admission. Baseline ~0.7. Likely prerenal azotemia with poor perfusion of the kidneys in setting of anemia and severe hypoalbuminemia (1.4) and thus poor oncotic pressure.   -Avoid nephrotoxic drugs. Hold home ARB, NSAID and Furosemide which likely exacerbated the ELAINE.  -was receiving NS early in admission; and given 1un PRBC on 10/14 and 2nd unit PRBC given on 10/15. Appropriate response in H&H to the second unit PRBC.  -Nephro ([facility] group) following, recs appreciated  -ELAINE resolving with BUN/Cr down to 26/0.77 today ; Discussed with nephro potential role for IV albumin -- no indication for IV albumin at this time per discussion with Dr. Vidal.  -added Faustino and now added Endure  to pt's meal and encouraged pt to improve her po intake and certainly the extra protein.  -monitor BMP
-Cr 2.2 on admission. Baseline ~0.7. Likely prerenal azotemia with poor perfusion of the kidneys in setting of anemia and severe hypoalbuminemia (1.4) and thus poor oncotic pressure.   -Avoid nephrotoxic drugs. Hold home ARB, NSAID and Furosemide which likely exacerbated the ELAINE.  -was receiving NS early in admission; and given 1un PRBC on 10/14 and 2nd unit PRBC given on 10/15. Appropriate response in H&H to the second unit PRBC.  -Nephro ([facility] group) following, recs appreciated  -ELAINE resolving with BUN/Cr down to 26/0.77 today ; Discussed with nephro potential role for IV albumin -- no indication for IV albumin at this time per discussion with Dr. Vidal.  -added Faustino and then added Ensure to pt's meal and encouraged pt to improve her po intake and especially the extra protein.  -monitor BMP
-Cr 2.2 on admission. Baseline ~0.7. Likely prerenal azotemia with poor perfusion of the kidneys in setting of anemia and severe hypoalbuminemia (1.4) and thus poor oncotic pressure.   -Avoid nephrotoxic drugs. Hold home ARB, NSAID and Furosemide which likely exacerbated the ELAINE.  -was receiving NS early in admission; and given 1un PRBC yesterday and Hgb with suboptimal response rising from 7.2 to only 7.9 post transfusion ; still with bleeding, and symptoms - will give 2nd unit PRBC today  -Nephro ([facility] group) following, recs appreciated  -ELAINE improving with BUN/Cr down to 41/1.0 today ; re-eval BMP after pt receives whole blood. Discussed with nephro potential role for IV albumin -- no indicaiton for IV albumin at this time per discussion with Dr. Vidal.  -added Faustino and now added Endure  to pt's meal and encouraged pt to improve her po intake and certainly the extra protein.  -monitor BMP
-Cr 2.2 on admission. Baseline ~0.7. Likely prerenal azotemia with poor perfusion of the kidneys in setting of anemia and severe hypoalbuminemia (1.4) and thus poor oncotic pressure.   -Avoid nephrotoxic drugs. Hold home ARB, NSAID and Furosemide which likely exacerbated the ELAINE.  -was receiving NS overnight, and will give 1un PRBC today as Hgb of 7.2 and dropping  -Nephro ([facility] group) following, recs appreciated  -ELAINE improving with Cr down to 1.7 today ; re-eval BMP after pt receives whole blood. Discuss with nephro potential role for IV albumin.  -added Faustino to pt's meal and encouraged pt to improve her po intake and certainly the extra protein.  -monitor BMP
Moderate complexity
reviewing chart and coordinating care with primary team/staff, as well as reviewing vitals, radiology, medication list, recent labs, and prior records.

## 2023-12-08 NOTE — DISCHARGE NOTE PROVIDER - PROVIDER TOKENS
PROVIDER:[TOKEN:[[id number]],FOLLOWUP:[2 weeks],ESTABLISHEDPATIENT:[T]] PROVIDER:[TOKEN:[[id number]],FOLLOWUP:[2 weeks],ESTABLISHEDPATIENT:[T]] PROVIDER:[TOKEN:[[id number]],SCHEDULEDAPPT:[12/19/2023],SCHEDULEDAPPTTIME:[03:00 PM],ESTABLISHEDPATIENT:[T]] PROVIDER:[TOKEN:[[id number]],SCHEDULEDAPPT:[12/19/2023],SCHEDULEDAPPTTIME:[03:00 PM],ESTABLISHEDPATIENT:[T]]

## 2023-12-08 NOTE — DISCHARGE NOTE PROVIDER - CARE PROVIDER_API CALL
Samia Zapienth  Internal Medicine  [address]  [place] [address]  Phone: [phone]  Fax: [phone]  Established Patient  Follow Up Time: 2 weeks   Samia Zapienth  Internal Medicine  [address]  [place] [address]  Phone: [phone]  Fax: [phone]  Established Patient  Follow Up Time: 2 weeks   Samia Zapienth  Internal Medicine  [address]  [place] [address]  Phone: [phone]  Fax: [phone]  Established Patient  Scheduled Appointment: 12/19/2023 03:00 PM   Samia Zapienth  Internal Medicine  [address]  [place] [address]  Phone: [phone]  Fax: [phone]  Established Patient  Scheduled Appointment: 12/19/2023 03:00 PM

## 2023-12-08 NOTE — CONSULT NOTE ADULT - ASSESSMENT
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 She has been clinically improving over the last several days with decreasing white count and decreasing oxygen requirements. She states her sputum is not as dark and less voluminous as before. Would recommend continuing prednisone for total of 5 days and completing treatment of CAP for 5 days total. Given her poor lung reserve she will likely have a prolonged recovery and cough may be persistent for weeks.     Recommendations  - Continue Rocephin and Azithro for CAP treatment for 5 days total  - Continue Prednisone for 5 days total  - OOBTC  - PT/OT  - Wean O2 as tolerated  - Please ensure patient has follow up with Dr. Zapien 2 weeks after discharge    Case s/e/d with Dr. Thompson

## 2023-12-08 NOTE — PHARMACY COMMUNICATION NOTE - COMMENTS
Admission Med Rec Reviewed:  Atorvastatin 20mg PO Qhs  Lomotil  Admission Med Rec Reviewed:    [facility]  Atorvastatin 20mg PO Qhs  Lomotil (diphenoxylate-atropine 2.5mg/0.025mg 2 tablets twice daily     [facility]  Forteo (Teriparatide) 20mcg subQ Qday

## 2023-12-09 LAB
ALBUMIN SERPL ELPH-MCNC: 3.1 G/DL — LOW (ref 3.3–5)
ALBUMIN SERPL ELPH-MCNC: 3.1 G/DL — LOW (ref 3.3–5)
ALP SERPL-CCNC: 83 U/L — SIGNIFICANT CHANGE UP (ref 40–120)
ALP SERPL-CCNC: 83 U/L — SIGNIFICANT CHANGE UP (ref 40–120)
ALT FLD-CCNC: 72 U/L — HIGH (ref 10–45)
ALT FLD-CCNC: 72 U/L — HIGH (ref 10–45)
ANION GAP SERPL CALC-SCNC: 7 MMOL/L — SIGNIFICANT CHANGE UP (ref 5–17)
ANION GAP SERPL CALC-SCNC: 7 MMOL/L — SIGNIFICANT CHANGE UP (ref 5–17)
ANISOCYTOSIS BLD QL: SLIGHT — SIGNIFICANT CHANGE UP
ANISOCYTOSIS BLD QL: SLIGHT — SIGNIFICANT CHANGE UP
AST SERPL-CCNC: 35 U/L — SIGNIFICANT CHANGE UP (ref 10–40)
AST SERPL-CCNC: 35 U/L — SIGNIFICANT CHANGE UP (ref 10–40)
BASOPHILS # BLD AUTO: 0 K/UL — SIGNIFICANT CHANGE UP (ref 0–0.2)
BASOPHILS # BLD AUTO: 0 K/UL — SIGNIFICANT CHANGE UP (ref 0–0.2)
BASOPHILS NFR BLD AUTO: 0 % — SIGNIFICANT CHANGE UP (ref 0–2)
BASOPHILS NFR BLD AUTO: 0 % — SIGNIFICANT CHANGE UP (ref 0–2)
BILIRUB SERPL-MCNC: 0.3 MG/DL — SIGNIFICANT CHANGE UP (ref 0.2–1.2)
BILIRUB SERPL-MCNC: 0.3 MG/DL — SIGNIFICANT CHANGE UP (ref 0.2–1.2)
BUN SERPL-MCNC: 26 MG/DL — HIGH (ref 7–23)
BUN SERPL-MCNC: 26 MG/DL — HIGH (ref 7–23)
CALCIUM SERPL-MCNC: 9.7 MG/DL — SIGNIFICANT CHANGE UP (ref 8.4–10.5)
CALCIUM SERPL-MCNC: 9.7 MG/DL — SIGNIFICANT CHANGE UP (ref 8.4–10.5)
CHLORIDE SERPL-SCNC: 98 MMOL/L — SIGNIFICANT CHANGE UP (ref 96–108)
CHLORIDE SERPL-SCNC: 98 MMOL/L — SIGNIFICANT CHANGE UP (ref 96–108)
CO2 SERPL-SCNC: 32 MMOL/L — HIGH (ref 22–31)
CO2 SERPL-SCNC: 32 MMOL/L — HIGH (ref 22–31)
CREAT SERPL-MCNC: 0.72 MG/DL — SIGNIFICANT CHANGE UP (ref 0.5–1.3)
CREAT SERPL-MCNC: 0.72 MG/DL — SIGNIFICANT CHANGE UP (ref 0.5–1.3)
CULTURE RESULTS: SIGNIFICANT CHANGE UP
EGFR: 89 ML/MIN/1.73M2 — SIGNIFICANT CHANGE UP
EGFR: 89 ML/MIN/1.73M2 — SIGNIFICANT CHANGE UP
EOSINOPHIL # BLD AUTO: 0.4 K/UL — SIGNIFICANT CHANGE UP (ref 0–0.5)
EOSINOPHIL # BLD AUTO: 0.4 K/UL — SIGNIFICANT CHANGE UP (ref 0–0.5)
EOSINOPHIL NFR BLD AUTO: 2.7 % — SIGNIFICANT CHANGE UP (ref 0–6)
EOSINOPHIL NFR BLD AUTO: 2.7 % — SIGNIFICANT CHANGE UP (ref 0–6)
GIANT PLATELETS BLD QL SMEAR: PRESENT — SIGNIFICANT CHANGE UP
GIANT PLATELETS BLD QL SMEAR: PRESENT — SIGNIFICANT CHANGE UP
GLUCOSE BLDC GLUCOMTR-MCNC: 126 MG/DL — HIGH (ref 70–99)
GLUCOSE BLDC GLUCOMTR-MCNC: 126 MG/DL — HIGH (ref 70–99)
GLUCOSE BLDC GLUCOMTR-MCNC: 168 MG/DL — HIGH (ref 70–99)
GLUCOSE BLDC GLUCOMTR-MCNC: 168 MG/DL — HIGH (ref 70–99)
GLUCOSE BLDC GLUCOMTR-MCNC: 187 MG/DL — HIGH (ref 70–99)
GLUCOSE BLDC GLUCOMTR-MCNC: 187 MG/DL — HIGH (ref 70–99)
GLUCOSE SERPL-MCNC: 108 MG/DL — HIGH (ref 70–99)
GLUCOSE SERPL-MCNC: 108 MG/DL — HIGH (ref 70–99)
HCT VFR BLD CALC: 36.2 % — SIGNIFICANT CHANGE UP (ref 34.5–45)
HCT VFR BLD CALC: 36.2 % — SIGNIFICANT CHANGE UP (ref 34.5–45)
HGB BLD-MCNC: 11.5 G/DL — SIGNIFICANT CHANGE UP (ref 11.5–15.5)
HGB BLD-MCNC: 11.5 G/DL — SIGNIFICANT CHANGE UP (ref 11.5–15.5)
LYMPHOCYTES # BLD AUTO: 2.98 K/UL — SIGNIFICANT CHANGE UP (ref 1–3.3)
LYMPHOCYTES # BLD AUTO: 2.98 K/UL — SIGNIFICANT CHANGE UP (ref 1–3.3)
LYMPHOCYTES # BLD AUTO: 20.3 % — SIGNIFICANT CHANGE UP (ref 13–44)
LYMPHOCYTES # BLD AUTO: 20.3 % — SIGNIFICANT CHANGE UP (ref 13–44)
MACROCYTES BLD QL: SLIGHT — SIGNIFICANT CHANGE UP
MACROCYTES BLD QL: SLIGHT — SIGNIFICANT CHANGE UP
MAGNESIUM SERPL-MCNC: 2.2 MG/DL — SIGNIFICANT CHANGE UP (ref 1.6–2.6)
MAGNESIUM SERPL-MCNC: 2.2 MG/DL — SIGNIFICANT CHANGE UP (ref 1.6–2.6)
MANUAL SMEAR VERIFICATION: SIGNIFICANT CHANGE UP
MANUAL SMEAR VERIFICATION: SIGNIFICANT CHANGE UP
MCHC RBC-ENTMCNC: 28.5 PG — SIGNIFICANT CHANGE UP (ref 27–34)
MCHC RBC-ENTMCNC: 28.5 PG — SIGNIFICANT CHANGE UP (ref 27–34)
MCHC RBC-ENTMCNC: 31.8 GM/DL — LOW (ref 32–36)
MCHC RBC-ENTMCNC: 31.8 GM/DL — LOW (ref 32–36)
MCV RBC AUTO: 89.6 FL — SIGNIFICANT CHANGE UP (ref 80–100)
MCV RBC AUTO: 89.6 FL — SIGNIFICANT CHANGE UP (ref 80–100)
METAMYELOCYTES # FLD: 1.8 % — HIGH (ref 0–0)
METAMYELOCYTES # FLD: 1.8 % — HIGH (ref 0–0)
MICROCYTES BLD QL: SLIGHT — SIGNIFICANT CHANGE UP
MICROCYTES BLD QL: SLIGHT — SIGNIFICANT CHANGE UP
MONOCYTES # BLD AUTO: 0.65 K/UL — SIGNIFICANT CHANGE UP (ref 0–0.9)
MONOCYTES # BLD AUTO: 0.65 K/UL — SIGNIFICANT CHANGE UP (ref 0–0.9)
MONOCYTES NFR BLD AUTO: 4.4 % — SIGNIFICANT CHANGE UP (ref 2–14)
MONOCYTES NFR BLD AUTO: 4.4 % — SIGNIFICANT CHANGE UP (ref 2–14)
MYELOCYTES NFR BLD: 1.8 % — HIGH (ref 0–0)
MYELOCYTES NFR BLD: 1.8 % — HIGH (ref 0–0)
NEUTROPHILS # BLD AUTO: 10.12 K/UL — HIGH (ref 1.8–7.4)
NEUTROPHILS # BLD AUTO: 10.12 K/UL — HIGH (ref 1.8–7.4)
NEUTROPHILS NFR BLD AUTO: 69 % — SIGNIFICANT CHANGE UP (ref 43–77)
NEUTROPHILS NFR BLD AUTO: 69 % — SIGNIFICANT CHANGE UP (ref 43–77)
OVALOCYTES BLD QL SMEAR: SLIGHT — SIGNIFICANT CHANGE UP
OVALOCYTES BLD QL SMEAR: SLIGHT — SIGNIFICANT CHANGE UP
PHOSPHATE SERPL-MCNC: 4.3 MG/DL — SIGNIFICANT CHANGE UP (ref 2.5–4.5)
PHOSPHATE SERPL-MCNC: 4.3 MG/DL — SIGNIFICANT CHANGE UP (ref 2.5–4.5)
PLAT MORPH BLD: ABNORMAL
PLAT MORPH BLD: ABNORMAL
PLATELET # BLD AUTO: 367 K/UL — SIGNIFICANT CHANGE UP (ref 150–400)
PLATELET # BLD AUTO: 367 K/UL — SIGNIFICANT CHANGE UP (ref 150–400)
POIKILOCYTOSIS BLD QL AUTO: SLIGHT — SIGNIFICANT CHANGE UP
POIKILOCYTOSIS BLD QL AUTO: SLIGHT — SIGNIFICANT CHANGE UP
POLYCHROMASIA BLD QL SMEAR: SLIGHT — SIGNIFICANT CHANGE UP
POLYCHROMASIA BLD QL SMEAR: SLIGHT — SIGNIFICANT CHANGE UP
POTASSIUM SERPL-MCNC: 4.3 MMOL/L — SIGNIFICANT CHANGE UP (ref 3.5–5.3)
POTASSIUM SERPL-MCNC: 4.3 MMOL/L — SIGNIFICANT CHANGE UP (ref 3.5–5.3)
POTASSIUM SERPL-SCNC: 4.3 MMOL/L — SIGNIFICANT CHANGE UP (ref 3.5–5.3)
POTASSIUM SERPL-SCNC: 4.3 MMOL/L — SIGNIFICANT CHANGE UP (ref 3.5–5.3)
PROT SERPL-MCNC: 6.5 G/DL — SIGNIFICANT CHANGE UP (ref 6–8.3)
PROT SERPL-MCNC: 6.5 G/DL — SIGNIFICANT CHANGE UP (ref 6–8.3)
RBC # BLD: 4.04 M/UL — SIGNIFICANT CHANGE UP (ref 3.8–5.2)
RBC # BLD: 4.04 M/UL — SIGNIFICANT CHANGE UP (ref 3.8–5.2)
RBC # FLD: 14.2 % — SIGNIFICANT CHANGE UP (ref 10.3–14.5)
RBC # FLD: 14.2 % — SIGNIFICANT CHANGE UP (ref 10.3–14.5)
RBC BLD AUTO: ABNORMAL
RBC BLD AUTO: ABNORMAL
SMUDGE CELLS # BLD: PRESENT — SIGNIFICANT CHANGE UP
SMUDGE CELLS # BLD: PRESENT — SIGNIFICANT CHANGE UP
SODIUM SERPL-SCNC: 137 MMOL/L — SIGNIFICANT CHANGE UP (ref 135–145)
SODIUM SERPL-SCNC: 137 MMOL/L — SIGNIFICANT CHANGE UP (ref 135–145)
SPECIMEN SOURCE: SIGNIFICANT CHANGE UP
WBC # BLD: 14.67 K/UL — HIGH (ref 3.8–10.5)
WBC # BLD: 14.67 K/UL — HIGH (ref 3.8–10.5)
WBC # FLD AUTO: 14.67 K/UL — HIGH (ref 3.8–10.5)
WBC # FLD AUTO: 14.67 K/UL — HIGH (ref 3.8–10.5)

## 2023-12-09 PROCEDURE — 99232 SBSQ HOSP IP/OBS MODERATE 35: CPT

## 2023-12-09 RX ORDER — CEFPODOXIME PROXETIL 100 MG
200 TABLET ORAL EVERY 12 HOURS
Refills: 0 | Status: DISCONTINUED | OUTPATIENT
Start: 2023-12-09 | End: 2023-12-10

## 2023-12-09 RX ADMIN — Medication 2: at 13:00

## 2023-12-09 RX ADMIN — LEVALBUTEROL 1.25 MILLIGRAM(S): 1.25 SOLUTION, CONCENTRATE RESPIRATORY (INHALATION) at 23:41

## 2023-12-09 RX ADMIN — LEVALBUTEROL 1.25 MILLIGRAM(S): 1.25 SOLUTION, CONCENTRATE RESPIRATORY (INHALATION) at 18:37

## 2023-12-09 RX ADMIN — Medication 40 MILLIGRAM(S): at 07:59

## 2023-12-09 RX ADMIN — Medication 500 MICROGRAM(S): at 18:36

## 2023-12-09 RX ADMIN — Medication 500 MICROGRAM(S): at 10:41

## 2023-12-09 RX ADMIN — Medication 500 MICROGRAM(S): at 06:25

## 2023-12-09 RX ADMIN — LEVALBUTEROL 1.25 MILLIGRAM(S): 1.25 SOLUTION, CONCENTRATE RESPIRATORY (INHALATION) at 15:56

## 2023-12-09 RX ADMIN — Medication 500 MICROGRAM(S): at 03:11

## 2023-12-09 RX ADMIN — Medication 200 MILLIGRAM(S): at 22:16

## 2023-12-09 RX ADMIN — Medication 2: at 18:36

## 2023-12-09 RX ADMIN — PANTOPRAZOLE SODIUM 40 MILLIGRAM(S): 20 TABLET, DELAYED RELEASE ORAL at 06:25

## 2023-12-09 RX ADMIN — LEVALBUTEROL 1.25 MILLIGRAM(S): 1.25 SOLUTION, CONCENTRATE RESPIRATORY (INHALATION) at 10:41

## 2023-12-09 RX ADMIN — Medication 200 MILLIGRAM(S): at 10:41

## 2023-12-09 RX ADMIN — Medication 500 MICROGRAM(S): at 15:58

## 2023-12-09 RX ADMIN — LEVALBUTEROL 1.25 MILLIGRAM(S): 1.25 SOLUTION, CONCENTRATE RESPIRATORY (INHALATION) at 03:11

## 2023-12-09 RX ADMIN — LEVALBUTEROL 1.25 MILLIGRAM(S): 1.25 SOLUTION, CONCENTRATE RESPIRATORY (INHALATION) at 06:24

## 2023-12-09 RX ADMIN — Medication 500 MICROGRAM(S): at 22:16

## 2023-12-09 NOTE — PROGRESS NOTE ADULT - NUTRITIONAL ASSESSMENT
This patient has been assessed with a concern for Malnutrition and has been determined to have a diagnosis/diagnoses of Severe protein-calorie malnutrition and Underweight (BMI < 19).    This patient is being managed with:   Diet Regular-  Supplement Feeding Modality:  Oral  Ensure Plus High Protein Cans or Servings Per Day:  1       Frequency:  Three Times a day  Ensure Max Cans or Servings Per Day:  1       Frequency:  Three Times a day  Entered: Dec  5 2023  2:20PM  

## 2023-12-09 NOTE — PROGRESS NOTE ADULT - SUBJECTIVE AND OBJECTIVE BOX
INTERVAL EVENTS: Dropped her oxygenm saturation with ambulation and at rest this AM. Overall, feels better     PAST MEDICAL & SURGICAL HISTORY:  No pertinent past medical history    H/O emphysema    HLD (hyperlipidemia)    Parotid tumor  resection        MEDICATIONS  (STANDING):  budesonide 160 MICROgram(s)/formoterol 4.5 MICROgram(s) Inhaler 2 Puff(s) Inhalation two times a day  cefpodoxime 200 milliGRAM(s) Oral every 12 hours  dextrose 5%. 1000 milliLiter(s) (50 mL/Hr) IV Continuous <Continuous>  dextrose 5%. 1000 milliLiter(s) (100 mL/Hr) IV Continuous <Continuous>  dextrose 50% Injectable 12.5 Gram(s) IV Push once  dextrose 50% Injectable 25 Gram(s) IV Push once  dextrose 50% Injectable 25 Gram(s) IV Push once  enoxaparin Injectable 40 milliGRAM(s) SubCutaneous every 24 hours  glucagon  Injectable 1 milliGRAM(s) IntraMuscular once  insulin lispro (ADMELOG) corrective regimen sliding scale   SubCutaneous three times a day before meals  ipratropium    for Nebulization 500 MICROGram(s) Nebulizer every 4 hours  levalbuterol Inhalation 1.25 milliGRAM(s) Inhalation every 4 hours  pantoprazole    Tablet 40 milliGRAM(s) Oral before breakfast  predniSONE   Tablet 40 milliGRAM(s) Oral every 24 hours    MEDICATIONS  (PRN):  acetaminophen     Tablet .. 975 milliGRAM(s) Oral every 6 hours PRN Temp greater or equal to 38C (100.4F), Mild Pain (1 - 3)  dextrose Oral Gel 15 Gram(s) Oral once PRN Blood Glucose LESS THAN 70 milliGRAM(s)/deciliter  sodium chloride 0.65% Nasal 1 Spray(s) Both Nostrils three times a day PRN Nasal Congestion    Vital Signs Last 24 Hrs  T(C): 36.4 (09 Dec 2023 09:58), Max: 36.8 (09 Dec 2023 05:51)  T(F): 97.6 (09 Dec 2023 09:58), Max: 98.2 (09 Dec 2023 05:51)  HR: 87 (09 Dec 2023 09:58) (85 - 111)  BP: 117/72 (09 Dec 2023 09:58) (100/67 - 125/78)  BP(mean): 78 (08 Dec 2023 15:08) (78 - 78)  RR: 18 (09 Dec 2023 09:58) (17 - 18)  SpO2: 92% (09 Dec 2023 09:58) (86% - 95%)    Parameters below as of 09 Dec 2023 09:58  Patient On (Oxygen Delivery Method): nasal cannula  O2 Flow (L/min): 2      PHYSICAL EXAM:  GEN: Awake, alert. NAD.   HEENT: NCAT, PERRL, EOMI. Mucosa moist. No JVD.  RESP: CTA b/l  CV: RRR. Normal S1/S2. No m/r/g.  ABD: Soft. NT/ND. BS+  EXT: Warm. No edema, clubbing, or cyanosis.   NEURO: AAOx3. No focal deficits.     LABS:                        11.5   14.67 )-----------( 367      ( 09 Dec 2023 08:02 )             36.2     12-09    137  |  98  |  26<H>  ----------------------------<  108<H>  4.3   |  32<H>  |  0.72    Ca    9.7      09 Dec 2023 08:02  Phos  4.3     12-09  Mg     2.2     12-09    TPro  6.5  /  Alb  3.1<L>  /  TBili  0.3  /  DBili  x   /  AST  35  /  ALT  72<H>  /  AlkPhos  83  12-09          Urinalysis Basic - ( 09 Dec 2023 08:02 )    Color: x / Appearance: x / SG: x / pH: x  Gluc: 108 mg/dL / Ketone: x  / Bili: x / Urobili: x   Blood: x / Protein: x / Nitrite: x   Leuk Esterase: x / RBC: x / WBC x   Sq Epi: x / Non Sq Epi: x / Bacteria: x      I&O's Summary

## 2023-12-09 NOTE — PROGRESS NOTE ADULT - ASSESSMENT
73F with PMH of emphysema, HLD and osteoporosis presenting with dyspnea, cough, congestion and sore throat, admitted with COPD exacerbation and RSV positive. Initially on 7la and then stepped down to RMF.    #SIRS  #COPD exacerbation 2/2 RSV  #AHRF  #HLD  - continue with steroids for COPD exacerbation - 5 d total   - continue with cefpodoxime. Refused IV access.   - OOBTC   - Will wean O2 as tolerated    Dispo: Home likely tomorrow if amb sats are OK

## 2023-12-10 ENCOUNTER — TRANSCRIPTION ENCOUNTER (OUTPATIENT)
Age: 73
End: 2023-12-10

## 2023-12-10 VITALS — OXYGEN SATURATION: 91 %

## 2023-12-10 DIAGNOSIS — B33.8 OTHER SPECIFIED VIRAL DISEASES: ICD-10-CM

## 2023-12-10 LAB
GLUCOSE BLDC GLUCOMTR-MCNC: 146 MG/DL — HIGH (ref 70–99)
GLUCOSE BLDC GLUCOMTR-MCNC: 146 MG/DL — HIGH (ref 70–99)

## 2023-12-10 PROCEDURE — 83735 ASSAY OF MAGNESIUM: CPT

## 2023-12-10 PROCEDURE — 87641 MR-STAPH DNA AMP PROBE: CPT

## 2023-12-10 PROCEDURE — 87040 BLOOD CULTURE FOR BACTERIA: CPT

## 2023-12-10 PROCEDURE — 81001 URINALYSIS AUTO W/SCOPE: CPT

## 2023-12-10 PROCEDURE — 85025 COMPLETE CBC W/AUTO DIFF WBC: CPT

## 2023-12-10 PROCEDURE — 80053 COMPREHEN METABOLIC PANEL: CPT

## 2023-12-10 PROCEDURE — 86850 RBC ANTIBODY SCREEN: CPT

## 2023-12-10 PROCEDURE — 94640 AIRWAY INHALATION TREATMENT: CPT

## 2023-12-10 PROCEDURE — 97161 PT EVAL LOW COMPLEX 20 MIN: CPT

## 2023-12-10 PROCEDURE — 71045 X-RAY EXAM CHEST 1 VIEW: CPT

## 2023-12-10 PROCEDURE — 99285 EMERGENCY DEPT VISIT HI MDM: CPT

## 2023-12-10 PROCEDURE — 83605 ASSAY OF LACTIC ACID: CPT

## 2023-12-10 PROCEDURE — 84100 ASSAY OF PHOSPHORUS: CPT

## 2023-12-10 PROCEDURE — 36415 COLL VENOUS BLD VENIPUNCTURE: CPT

## 2023-12-10 PROCEDURE — 99239 HOSP IP/OBS DSCHRG MGMT >30: CPT

## 2023-12-10 PROCEDURE — 82962 GLUCOSE BLOOD TEST: CPT

## 2023-12-10 PROCEDURE — 87449 NOS EACH ORGANISM AG IA: CPT

## 2023-12-10 PROCEDURE — 84145 PROCALCITONIN (PCT): CPT

## 2023-12-10 PROCEDURE — 87640 STAPH A DNA AMP PROBE: CPT

## 2023-12-10 PROCEDURE — 84443 ASSAY THYROID STIM HORMONE: CPT

## 2023-12-10 PROCEDURE — 96375 TX/PRO/DX INJ NEW DRUG ADDON: CPT

## 2023-12-10 PROCEDURE — C8929: CPT

## 2023-12-10 PROCEDURE — 93005 ELECTROCARDIOGRAM TRACING: CPT

## 2023-12-10 PROCEDURE — 80048 BASIC METABOLIC PNL TOTAL CA: CPT

## 2023-12-10 PROCEDURE — 86900 BLOOD TYPING SEROLOGIC ABO: CPT

## 2023-12-10 PROCEDURE — 86901 BLOOD TYPING SEROLOGIC RH(D): CPT

## 2023-12-10 PROCEDURE — 87899 AGENT NOS ASSAY W/OPTIC: CPT

## 2023-12-10 PROCEDURE — 86803 HEPATITIS C AB TEST: CPT

## 2023-12-10 PROCEDURE — 85730 THROMBOPLASTIN TIME PARTIAL: CPT

## 2023-12-10 PROCEDURE — 83036 HEMOGLOBIN GLYCOSYLATED A1C: CPT

## 2023-12-10 PROCEDURE — 85610 PROTHROMBIN TIME: CPT

## 2023-12-10 PROCEDURE — 0225U NFCT DS DNA&RNA 21 SARSCOV2: CPT

## 2023-12-10 PROCEDURE — 87086 URINE CULTURE/COLONY COUNT: CPT

## 2023-12-10 PROCEDURE — 96374 THER/PROPH/DIAG INJ IV PUSH: CPT

## 2023-12-10 RX ORDER — CEFPODOXIME PROXETIL 100 MG
1 TABLET ORAL
Qty: 2 | Refills: 0
Start: 2023-12-10 | End: 2023-12-10

## 2023-12-10 RX ADMIN — Medication 200 MILLIGRAM(S): at 09:39

## 2023-12-10 RX ADMIN — Medication 40 MILLIGRAM(S): at 06:26

## 2023-12-10 RX ADMIN — LEVALBUTEROL 1.25 MILLIGRAM(S): 1.25 SOLUTION, CONCENTRATE RESPIRATORY (INHALATION) at 06:27

## 2023-12-10 RX ADMIN — LEVALBUTEROL 1.25 MILLIGRAM(S): 1.25 SOLUTION, CONCENTRATE RESPIRATORY (INHALATION) at 03:41

## 2023-12-10 RX ADMIN — Medication 500 MICROGRAM(S): at 09:40

## 2023-12-10 RX ADMIN — Medication 500 MICROGRAM(S): at 01:24

## 2023-12-10 RX ADMIN — Medication 500 MICROGRAM(S): at 06:26

## 2023-12-10 RX ADMIN — LEVALBUTEROL 1.25 MILLIGRAM(S): 1.25 SOLUTION, CONCENTRATE RESPIRATORY (INHALATION) at 09:39

## 2023-12-10 RX ADMIN — PANTOPRAZOLE SODIUM 40 MILLIGRAM(S): 20 TABLET, DELAYED RELEASE ORAL at 06:26

## 2023-12-10 NOTE — DISCHARGE NOTE NURSING/CASE MANAGEMENT/SOCIAL WORK - PATIENT PORTAL LINK FT
You can access the FollowMyHealth Patient Portal offered by [facility] by registering at the following website: [email or web address]. By joining [facility]’s FollowMyHealth portal, you will also be able to view your health information using other applications (apps) compatible with our system. You can access the FollowMyHealth Patient Portal offered by [facility] by registering at the following website: [email or web address]. By joining [facility]’s FollowMyHealth portal, you will also be able to view your health information using other applications (apps) compatible with our system.

## 2023-12-10 NOTE — DISCHARGE NOTE NURSING/CASE MANAGEMENT/SOCIAL WORK - NSDCPEFALRISK_GEN_ALL_CORE
For information on Fall & Injury Prevention, visit: [email or web address] OR  [email or web address]-tips-[email or web address]-injury OR  https://www.cdc.gov/steadi/patient.html For information on Fall & Injury Prevention, visit: [email or web address]://www.[facility]/news/fall-prevention-protects-and-maintains-[email or web address]-mobility OR  [email or web address]://www.[facility]/news/fall-prevention-tips-to-avoid-injury OR  https://www.cdc.gov/steadi/patient.html

## 2023-12-11 ENCOUNTER — TRANSCRIPTION ENCOUNTER (OUTPATIENT)
Age: 73
End: 2023-12-11

## 2023-12-11 ENCOUNTER — NON-APPOINTMENT (OUTPATIENT)
Age: 73
End: 2023-12-11

## 2023-12-12 PROBLEM — J43.9 EMPHYSEMA, UNSPECIFIED: Chronic | Status: ACTIVE | Noted: 2023-12-04

## 2023-12-12 PROBLEM — E78.5 HYPERLIPIDEMIA, UNSPECIFIED: Chronic | Status: ACTIVE | Noted: 2023-12-04

## 2023-12-13 ENCOUNTER — TRANSCRIPTION ENCOUNTER (OUTPATIENT)
Age: 73
End: 2023-12-13

## 2023-12-14 ENCOUNTER — NON-APPOINTMENT (OUTPATIENT)
Age: 73
End: 2023-12-14

## 2023-12-15 DIAGNOSIS — J43.9 EMPHYSEMA, UNSPECIFIED: ICD-10-CM

## 2023-12-15 DIAGNOSIS — J44.1 CHRONIC OBSTRUCTIVE PULMONARY DISEASE WITH (ACUTE) EXACERBATION: ICD-10-CM

## 2023-12-15 DIAGNOSIS — J96.01 ACUTE RESPIRATORY FAILURE WITH HYPOXIA: ICD-10-CM

## 2023-12-15 DIAGNOSIS — A41.9 SEPSIS, UNSPECIFIED ORGANISM: ICD-10-CM

## 2023-12-15 DIAGNOSIS — Z87.891 PERSONAL HISTORY OF NICOTINE DEPENDENCE: ICD-10-CM

## 2023-12-15 DIAGNOSIS — J12.1 RESPIRATORY SYNCYTIAL VIRUS PNEUMONIA: ICD-10-CM

## 2023-12-15 DIAGNOSIS — E78.5 HYPERLIPIDEMIA, UNSPECIFIED: ICD-10-CM

## 2023-12-15 DIAGNOSIS — E43 UNSPECIFIED SEVERE PROTEIN-CALORIE MALNUTRITION: ICD-10-CM

## 2023-12-15 DIAGNOSIS — R91.8 OTHER NONSPECIFIC ABNORMAL FINDING OF LUNG FIELD: ICD-10-CM

## 2023-12-15 DIAGNOSIS — M81.0 AGE-RELATED OSTEOPOROSIS WITHOUT CURRENT PATHOLOGICAL FRACTURE: ICD-10-CM

## 2023-12-15 DIAGNOSIS — B97.4 RESPIRATORY SYNCYTIAL VIRUS AS THE CAUSE OF DISEASES CLASSIFIED ELSEWHERE: ICD-10-CM

## 2023-12-15 DIAGNOSIS — J44.0 CHRONIC OBSTRUCTIVE PULMONARY DISEASE WITH ACUTE LOWER RESPIRATORY INFECTION: ICD-10-CM

## 2023-12-15 DIAGNOSIS — R06.02 SHORTNESS OF BREATH: ICD-10-CM

## 2023-12-15 DIAGNOSIS — R73.9 HYPERGLYCEMIA, UNSPECIFIED: ICD-10-CM

## 2023-12-18 ENCOUNTER — TRANSCRIPTION ENCOUNTER (OUTPATIENT)
Age: 73
End: 2023-12-18

## 2023-12-19 ENCOUNTER — APPOINTMENT (OUTPATIENT)
Dept: PULMONOLOGY | Facility: CLINIC | Age: 73
End: 2023-12-19
Payer: MEDICARE

## 2023-12-19 VITALS
TEMPERATURE: 98.2 F | HEART RATE: 96 BPM | HEIGHT: 60 IN | SYSTOLIC BLOOD PRESSURE: 116 MMHG | OXYGEN SATURATION: 96 % | BODY MASS INDEX: 17.08 KG/M2 | DIASTOLIC BLOOD PRESSURE: 80 MMHG | WEIGHT: 87 LBS

## 2023-12-19 DIAGNOSIS — J44.9 CHRONIC OBSTRUCTIVE PULMONARY DISEASE, UNSPECIFIED: ICD-10-CM

## 2023-12-19 PROCEDURE — 94729 DIFFUSING CAPACITY: CPT

## 2023-12-19 PROCEDURE — 94060 EVALUATION OF WHEEZING: CPT

## 2023-12-19 PROCEDURE — 94727 GAS DIL/WSHOT DETER LNG VOL: CPT

## 2023-12-19 PROCEDURE — 99214 OFFICE O/P EST MOD 30 MIN: CPT | Mod: 25

## 2023-12-19 NOTE — ASSESSMENT
[FreeTextEntry1] : Data reviewed:  LDCT [facility] 11/2022: Severe emphysematous changes. Bilateral pulmonary nodules including a 0.5 cm left apical nodule which may represent an area of nodular scarring.  CXR [facility] 11/2023 personally reviewed : modest basilar opacities  PFT 12/19/2023: mod-sev fixed obstruction, FEV1 51%, couldn't do volumes, DLCO 35%  Impression: Emphysema w moderate COPD Exacerbation due to RSV requiring hospitalization Nov 2023  Plan: Start dual dilator for mod COPD w recent severe exacerbation. Showed her Ellipta vs Stiolto, prefers Stiolto and easier for her to use. For follow up imaging, is due for LDCT anyway, but suggest defer to Jan-Feb to avoid picking up incidental findings post this infection. See me 3-4 mos.

## 2023-12-19 NOTE — CONSULT LETTER
[Dear  ___] : Dear  [unfilled], [Courtesy Letter:] : I had the pleasure of seeing your patient, [unfilled], in my office today. [Please see my note below.] : Please see my note below. [Consult Closing:] : Thank you very much for allowing me to participate in the care of this patient.  If you have any questions, please do not hesitate to contact me. [Sincerely,] : Sincerely, [FreeTextEntry2] : Franklyn Lincoln MD\par  [address], [address]\[place] [address]\par   [FreeTextEntry3] : Samia Zapien MD, FCCP\par

## 2023-12-19 NOTE — HISTORY OF PRESENT ILLNESS
[TextBox_4] : 2022: Asked to evaluate patient by Dr Lincoln for lung nodules. Smoked about 2 ppd till 6-7 years ago. Has been told she has emphysema. Some dyspnea but not limited in her day to day activities; no history of respiratory exacerbations. No cough or sputum, no hemoptysis, no weight loss. No personal history of cancer. Her brother  in his 80s of lung cancer. 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  Her  was Dr Perdoom's patient and survived critical illness after colonoscopy complications in about . He  of Covid in 2020 along with her brother-in-law and sister-in-law. She was never symptomatic but had antibodies subsequently. She is fully vaccinated.  2023: Returns after hospitalization for presumed COPD exacerbation due to RSV, given steroids and cefpodoxime. Had been vaccinated for RSV in Oct. Still not feeling like herself, but getting better. Nasal congestion. Today is the first day she's been out since being in hospital. [ESS] : 0

## 2023-12-27 ENCOUNTER — TRANSCRIPTION ENCOUNTER (OUTPATIENT)
Age: 73
End: 2023-12-27

## 2024-03-26 RX ORDER — TIOTROPIUM BROMIDE AND OLODATEROL 3.124; 2.736 UG/1; UG/1
2.5-2.5 SPRAY, METERED RESPIRATORY (INHALATION)
Qty: 3 | Refills: 3 | Status: DISCONTINUED | COMMUNITY
Start: 2023-12-19 | End: 2024-03-26

## 2024-03-26 RX ORDER — UMECLIDINIUM BROMIDE AND VILANTEROL TRIFENATATE 62.5; 25 UG/1; UG/1
62.5-25 POWDER RESPIRATORY (INHALATION)
Qty: 180 | Refills: 3 | Status: ACTIVE | COMMUNITY
Start: 2024-03-26 | End: 1900-01-01

## 2024-06-29 ENCOUNTER — EMERGENCY (EMERGENCY)
Facility: HOSPITAL | Age: 74
LOS: 1 days | Discharge: HOME | End: 2024-06-29
Attending: STUDENT IN AN ORGANIZED HEALTH CARE EDUCATION/TRAINING PROGRAM | Admitting: STUDENT IN AN ORGANIZED HEALTH CARE EDUCATION/TRAINING PROGRAM
Payer: MEDICARE

## 2024-06-29 VITALS
HEART RATE: 63 BPM | WEIGHT: 92.59 LBS | RESPIRATION RATE: 18 BRPM | OXYGEN SATURATION: 98 % | SYSTOLIC BLOOD PRESSURE: 101 MMHG | DIASTOLIC BLOOD PRESSURE: 65 MMHG | TEMPERATURE: 98 F

## 2024-06-29 DIAGNOSIS — Z87.891 PERSONAL HISTORY OF NICOTINE DEPENDENCE: ICD-10-CM

## 2024-06-29 DIAGNOSIS — M85.80 OTHER SPECIFIED DISORDERS OF BONE DENSITY AND STRUCTURE, UNSPECIFIED SITE: ICD-10-CM

## 2024-06-29 DIAGNOSIS — R55 SYNCOPE AND COLLAPSE: ICD-10-CM

## 2024-06-29 DIAGNOSIS — J44.9 CHRONIC OBSTRUCTIVE PULMONARY DISEASE, UNSPECIFIED: ICD-10-CM

## 2024-06-29 DIAGNOSIS — E78.5 HYPERLIPIDEMIA, UNSPECIFIED: ICD-10-CM

## 2024-06-29 DIAGNOSIS — D49.0 NEOPLASM OF UNSPECIFIED BEHAVIOR OF DIGESTIVE SYSTEM: Chronic | ICD-10-CM

## 2024-06-29 DIAGNOSIS — Z86.69 PERSONAL HISTORY OF OTHER DISEASES OF THE NERVOUS SYSTEM AND SENSE ORGANS: ICD-10-CM

## 2024-06-29 DIAGNOSIS — K58.9 IRRITABLE BOWEL SYNDROME WITHOUT DIARRHEA: ICD-10-CM

## 2024-06-29 LAB
ANION GAP SERPL CALC-SCNC: 11 MMOL/L — SIGNIFICANT CHANGE UP (ref 5–17)
BASOPHILS # BLD AUTO: 0.03 K/UL — SIGNIFICANT CHANGE UP (ref 0–0.2)
BASOPHILS NFR BLD AUTO: 0.4 % — SIGNIFICANT CHANGE UP (ref 0–2)
BUN SERPL-MCNC: 26 MG/DL — HIGH (ref 7–23)
CALCIUM SERPL-MCNC: 9.6 MG/DL — SIGNIFICANT CHANGE UP (ref 8.4–10.5)
CHLORIDE SERPL-SCNC: 98 MMOL/L — SIGNIFICANT CHANGE UP (ref 96–108)
CO2 SERPL-SCNC: 27 MMOL/L — SIGNIFICANT CHANGE UP (ref 22–31)
CREAT SERPL-MCNC: 0.69 MG/DL — SIGNIFICANT CHANGE UP (ref 0.5–1.3)
EGFR: 92 ML/MIN/1.73M2 — SIGNIFICANT CHANGE UP
EOSINOPHIL # BLD AUTO: 0.19 K/UL — SIGNIFICANT CHANGE UP (ref 0–0.5)
EOSINOPHIL NFR BLD AUTO: 2.3 % — SIGNIFICANT CHANGE UP (ref 0–6)
GLUCOSE SERPL-MCNC: 99 MG/DL — SIGNIFICANT CHANGE UP (ref 70–99)
HCT VFR BLD CALC: 44.2 % — SIGNIFICANT CHANGE UP (ref 34.5–45)
HGB BLD-MCNC: 14.7 G/DL — SIGNIFICANT CHANGE UP (ref 11.5–15.5)
IMM GRANULOCYTES NFR BLD AUTO: 0.2 % — SIGNIFICANT CHANGE UP (ref 0–0.9)
LIDOCAIN IGE QN: 71 U/L — HIGH (ref 7–60)
LYMPHOCYTES # BLD AUTO: 2.78 K/UL — SIGNIFICANT CHANGE UP (ref 1–3.3)
LYMPHOCYTES # BLD AUTO: 34.3 % — SIGNIFICANT CHANGE UP (ref 13–44)
MCHC RBC-ENTMCNC: 29.7 PG — SIGNIFICANT CHANGE UP (ref 27–34)
MCHC RBC-ENTMCNC: 33.3 GM/DL — SIGNIFICANT CHANGE UP (ref 32–36)
MCV RBC AUTO: 89.3 FL — SIGNIFICANT CHANGE UP (ref 80–100)
MONOCYTES # BLD AUTO: 0.86 K/UL — SIGNIFICANT CHANGE UP (ref 0–0.9)
MONOCYTES NFR BLD AUTO: 10.6 % — SIGNIFICANT CHANGE UP (ref 2–14)
NEUTROPHILS # BLD AUTO: 4.22 K/UL — SIGNIFICANT CHANGE UP (ref 1.8–7.4)
NEUTROPHILS NFR BLD AUTO: 52.2 % — SIGNIFICANT CHANGE UP (ref 43–77)
NRBC # BLD: 0 /100 WBCS — SIGNIFICANT CHANGE UP (ref 0–0)
PLATELET # BLD AUTO: 241 K/UL — SIGNIFICANT CHANGE UP (ref 150–400)
POTASSIUM SERPL-MCNC: 4 MMOL/L — SIGNIFICANT CHANGE UP (ref 3.5–5.3)
POTASSIUM SERPL-SCNC: 4 MMOL/L — SIGNIFICANT CHANGE UP (ref 3.5–5.3)
RBC # BLD: 4.95 M/UL — SIGNIFICANT CHANGE UP (ref 3.8–5.2)
RBC # FLD: 15.1 % — HIGH (ref 10.3–14.5)
SODIUM SERPL-SCNC: 136 MMOL/L — SIGNIFICANT CHANGE UP (ref 135–145)
TROPONIN T, HIGH SENSITIVITY RESULT: 9 NG/L — SIGNIFICANT CHANGE UP (ref 0–51)
WBC # BLD: 8.1 K/UL — SIGNIFICANT CHANGE UP (ref 3.8–10.5)
WBC # FLD AUTO: 8.1 K/UL — SIGNIFICANT CHANGE UP (ref 3.8–10.5)

## 2024-06-29 PROCEDURE — 81001 URINALYSIS AUTO W/SCOPE: CPT

## 2024-06-29 PROCEDURE — 99284 EMERGENCY DEPT VISIT MOD MDM: CPT | Mod: 25

## 2024-06-29 PROCEDURE — 82962 GLUCOSE BLOOD TEST: CPT

## 2024-06-29 PROCEDURE — 71045 X-RAY EXAM CHEST 1 VIEW: CPT

## 2024-06-29 PROCEDURE — 85025 COMPLETE CBC W/AUTO DIFF WBC: CPT

## 2024-06-29 PROCEDURE — 87086 URINE CULTURE/COLONY COUNT: CPT

## 2024-06-29 PROCEDURE — 83690 ASSAY OF LIPASE: CPT

## 2024-06-29 PROCEDURE — 80048 BASIC METABOLIC PNL TOTAL CA: CPT

## 2024-06-29 PROCEDURE — 84484 ASSAY OF TROPONIN QUANT: CPT

## 2024-06-29 PROCEDURE — 36415 COLL VENOUS BLD VENIPUNCTURE: CPT

## 2024-06-29 PROCEDURE — 99285 EMERGENCY DEPT VISIT HI MDM: CPT | Mod: FS

## 2024-06-29 PROCEDURE — 71045 X-RAY EXAM CHEST 1 VIEW: CPT | Mod: 26

## 2024-06-30 VITALS
RESPIRATION RATE: 18 BRPM | DIASTOLIC BLOOD PRESSURE: 74 MMHG | SYSTOLIC BLOOD PRESSURE: 118 MMHG | OXYGEN SATURATION: 95 % | TEMPERATURE: 97 F | HEART RATE: 63 BPM

## 2024-06-30 LAB
APPEARANCE UR: CLEAR — SIGNIFICANT CHANGE UP
BACTERIA # UR AUTO: NEGATIVE /HPF — SIGNIFICANT CHANGE UP
BILIRUB UR-MCNC: NEGATIVE — SIGNIFICANT CHANGE UP
CAST: 1 /LPF — SIGNIFICANT CHANGE UP (ref 0–4)
COLOR SPEC: YELLOW — SIGNIFICANT CHANGE UP
DIFF PNL FLD: NEGATIVE — SIGNIFICANT CHANGE UP
GLUCOSE UR QL: NEGATIVE MG/DL — SIGNIFICANT CHANGE UP
KETONES UR-MCNC: NEGATIVE MG/DL — SIGNIFICANT CHANGE UP
LEUKOCYTE ESTERASE UR-ACNC: ABNORMAL
NITRITE UR-MCNC: NEGATIVE — SIGNIFICANT CHANGE UP
PH UR: 6.5 — SIGNIFICANT CHANGE UP (ref 5–8)
PROT UR-MCNC: NEGATIVE MG/DL — SIGNIFICANT CHANGE UP
RBC CASTS # UR COMP ASSIST: 2 /HPF — SIGNIFICANT CHANGE UP (ref 0–4)
SP GR SPEC: 1.01 — SIGNIFICANT CHANGE UP (ref 1–1.03)
SQUAMOUS # UR AUTO: 1 /HPF — SIGNIFICANT CHANGE UP (ref 0–5)
UROBILINOGEN FLD QL: 0.2 MG/DL — SIGNIFICANT CHANGE UP (ref 0.2–1)
WBC UR QL: 17 /HPF — HIGH (ref 0–5)

## 2024-07-01 LAB
CULTURE RESULTS: SIGNIFICANT CHANGE UP
SPECIMEN SOURCE: SIGNIFICANT CHANGE UP

## 2024-07-09 ENCOUNTER — APPOINTMENT (OUTPATIENT)
Dept: PULMONOLOGY | Facility: CLINIC | Age: 74
End: 2024-07-09
Payer: MEDICARE

## 2024-07-09 DIAGNOSIS — Z87.891 PERSONAL HISTORY OF NICOTINE DEPENDENCE: ICD-10-CM

## 2024-07-09 PROCEDURE — 99442: CPT | Mod: 93

## 2024-07-09 RX ORDER — GLYCOPYRROLATE AND FORMOTEROL FUMARATE 9; 4.8 UG/1; UG/1
9-4.8 AEROSOL, METERED RESPIRATORY (INHALATION)
Qty: 3 | Refills: 3 | Status: ACTIVE | COMMUNITY
Start: 2024-07-09 | End: 1900-01-01

## 2024-07-09 RX ORDER — TIOTROPIUM BROMIDE AND OLODATEROL 3.124; 2.736 UG/1; UG/1
2.5-2.5 SPRAY, METERED RESPIRATORY (INHALATION)
Qty: 3 | Refills: 3 | Status: ACTIVE | COMMUNITY
Start: 2024-07-09 | End: 1900-01-01

## 2025-01-17 ENCOUNTER — RX RENEWAL (OUTPATIENT)
Age: 75
End: 2025-01-17

## 2025-07-29 ENCOUNTER — APPOINTMENT (OUTPATIENT)
Dept: PULMONOLOGY | Facility: CLINIC | Age: 75
End: 2025-07-29

## 2025-08-29 ENCOUNTER — APPOINTMENT (OUTPATIENT)
Dept: PULMONOLOGY | Facility: CLINIC | Age: 75
End: 2025-08-29
Payer: MEDICARE

## 2025-08-29 VITALS
HEART RATE: 63 BPM | DIASTOLIC BLOOD PRESSURE: 70 MMHG | HEIGHT: 60 IN | BODY MASS INDEX: 18.85 KG/M2 | SYSTOLIC BLOOD PRESSURE: 124 MMHG | WEIGHT: 96 LBS | TEMPERATURE: 98 F | OXYGEN SATURATION: 93 %

## 2025-08-29 PROCEDURE — 95012 NITRIC OXIDE EXP GAS DETER: CPT

## 2025-08-29 PROCEDURE — 99214 OFFICE O/P EST MOD 30 MIN: CPT | Mod: 25

## 2025-08-29 RX ORDER — ALBUTEROL SULFATE 90 UG/1
108 (90 BASE) INHALANT RESPIRATORY (INHALATION)
Qty: 1 | Refills: 3 | Status: ACTIVE | COMMUNITY
Start: 2025-08-29 | End: 1900-01-01